# Patient Record
Sex: FEMALE | Race: WHITE | NOT HISPANIC OR LATINO | URBAN - METROPOLITAN AREA
[De-identification: names, ages, dates, MRNs, and addresses within clinical notes are randomized per-mention and may not be internally consistent; named-entity substitution may affect disease eponyms.]

---

## 2017-10-17 ENCOUNTER — OFFICE VISIT (OUTPATIENT)
Dept: FAMILY MEDICINE | Facility: CLINIC | Age: 22
End: 2017-10-17
Payer: COMMERCIAL

## 2017-10-17 VITALS
SYSTOLIC BLOOD PRESSURE: 138 MMHG | HEIGHT: 64 IN | BODY MASS INDEX: 24.24 KG/M2 | WEIGHT: 142 LBS | DIASTOLIC BLOOD PRESSURE: 90 MMHG | TEMPERATURE: 98.3 F | HEART RATE: 109 BPM

## 2017-10-17 DIAGNOSIS — Z00.01 ENCOUNTER FOR ROUTINE ADULT HEALTH EXAMINATION WITH ABNORMAL FINDINGS: Primary | ICD-10-CM

## 2017-10-17 DIAGNOSIS — Z23 NEED FOR PROPHYLACTIC VACCINATION WITH TETANUS-DIPHTHERIA (TD): ICD-10-CM

## 2017-10-17 DIAGNOSIS — Z30.015 ENCOUNTER FOR INITIAL PRESCRIPTION OF VAGINAL RING HORMONAL CONTRACEPTIVE: ICD-10-CM

## 2017-10-17 DIAGNOSIS — B07.9 VIRAL WARTS, UNSPECIFIED TYPE: ICD-10-CM

## 2017-10-17 DIAGNOSIS — Z11.3 SCREENING EXAMINATION FOR VENEREAL DISEASE: ICD-10-CM

## 2017-10-17 DIAGNOSIS — Z23 NEED FOR PROPHYLACTIC VACCINATION AND INOCULATION AGAINST INFLUENZA: ICD-10-CM

## 2017-10-17 DIAGNOSIS — Z12.4 SCREENING FOR MALIGNANT NEOPLASM OF CERVIX: ICD-10-CM

## 2017-10-17 DIAGNOSIS — R03.0 ELEVATED BLOOD PRESSURE READING WITHOUT DIAGNOSIS OF HYPERTENSION: ICD-10-CM

## 2017-10-17 PROCEDURE — 90686 IIV4 VACC NO PRSV 0.5 ML IM: CPT | Performed by: FAMILY MEDICINE

## 2017-10-17 PROCEDURE — 90472 IMMUNIZATION ADMIN EACH ADD: CPT | Performed by: FAMILY MEDICINE

## 2017-10-17 PROCEDURE — 90715 TDAP VACCINE 7 YRS/> IM: CPT | Performed by: FAMILY MEDICINE

## 2017-10-17 PROCEDURE — 90471 IMMUNIZATION ADMIN: CPT | Performed by: FAMILY MEDICINE

## 2017-10-17 PROCEDURE — 99213 OFFICE O/P EST LOW 20 MIN: CPT | Mod: 25 | Performed by: FAMILY MEDICINE

## 2017-10-17 PROCEDURE — 99385 PREV VISIT NEW AGE 18-39: CPT | Mod: 25 | Performed by: FAMILY MEDICINE

## 2017-10-17 RX ORDER — ETONOGESTREL AND ETHINYL ESTRADIOL VAGINAL RING .015; .12 MG/D; MG/D
1 RING VAGINAL
Qty: 1 EACH | Refills: 1 | Status: SHIPPED | OUTPATIENT
Start: 2017-10-17 | End: 2018-11-26

## 2017-10-17 RX ORDER — ETONOGESTREL AND ETHINYL ESTRADIOL VAGINAL RING .015; .12 MG/D; MG/D
1 RING VAGINAL
COMMUNITY
End: 2017-10-17

## 2017-10-17 NOTE — NURSING NOTE
Screening Questionnaire for Adult Immunization    Are you sick today?   No   Do you have allergies to medications, food, a vaccine component or latex?   Yes   Have you ever had a serious reaction after receiving a vaccination?   No   Do you have a long-term health problem with heart disease, lung disease, asthma, kidney disease, metabolic disease (e.g. diabetes), anemia, or other blood disorder?   No   Do you have cancer, leukemia, HIV/AIDS, or any other immune system problem?   No   In the past 3 months, have you taken medications that affect  your immune system, such as prednisone, other steroids, or anticancer drugs; drugs for the treatment of rheumatoid arthritis, Crohn s disease, or psoriasis; or have you had radiation treatments?   No   Have you had a seizure, or a brain or other nervous system problem?   No   During the past year, have you received a transfusion of blood or blood     products, or been given immune (gamma) globulin or antiviral drug?   No   For women: Are you pregnant or is there a chance you could become        pregnant during the next month?   No   Have you received any vaccinations in the past 4 weeks?   No     Immunization questionnaire was positive for at least one answer.  Notified Dr. Burrell.        Per orders of Dr. Burrell, injection of TDaP given by Brittney Blankenship. Patient instructed to remain in clinic for 15 minutes afterwards, and to report any adverse reaction to me immediately.       Screening performed by Brittney Blankenship on 10/17/2017 at 1:50 PM.

## 2017-10-17 NOTE — PATIENT INSTRUCTIONS
Follow up with dermatoligy as planned  Get IUd handout  If doing iud placement with me then I would advised cytote    Preventive Health Recommendations  Female Ages 18 to 25     Yearly exam:     See your health care provider every year in order to  o Review health changes.   o Discuss preventive care.    o Review your medicines if your doctor has prescribed any.      You should be tested each year for STDs (sexually transmitted diseases).       After age 20, talk to your provider about how often you should have cholesterol testing.      Starting at age 21, get a Pap test every three years. If you have an abnormal result, your doctor may have you test more often.      If you are at risk for diabetes, you should have a diabetes test (fasting glucose).     Shots:     Get a flu shot each year.     Get a tetanus shot every 10 years.     Consider getting the shot (vaccine) that prevents cervical cancer (Gardasil).    Nutrition:     Eat at least 5 servings of fruits and vegetables each day.    Eat whole-grain bread, whole-wheat pasta and brown rice instead of white grains and rice.    Talk to your provider about Calcium and Vitamin D.     Lifestyle    Exercise at least 150 minutes a week each week (30 minutes a day, 5 days a week). This will help you control your weight and prevent disease.    Limit alcohol to one drink per day.    No smoking.     Wear sunscreen to prevent skin cancer.    See your dentist every six months for an exam and cleaning.    [facility]   Discharged by : Brittney VERONICA CMA ([facility])    Paper scripts provided to patient : none      If you have any questions regarding your visit please contact your care team:     [facility] Clinic Hours Telephone Number   Dr. Winifred Nuñez   7am-7pm Monday - Thursday   7am-5pm Fridays  [phone]   (Appointment scheduling available 24/7)   RN Line   [phone] option 2       For a  Price Quote for your services, please call our Consumer Price Line at [phone].     What options do I have for visits at the clinic other than the traditional office visit?     To expand how we care for you, many of our providers are utilizing electronic visits (e-visits) and telephone visits, when medically appropriate, for interactions with their patients rather than a visit in the clinic. We also offer nurse visits for many medical concerns. Just like any other service, we will bill your insurance company for this type of visit based on time spent on the phone with your provider. Not all insurance companies cover these visits. Please check with your medical insurance if this type of visit is covered. You will be responsible for any charges that are not paid by your insurance.   E-visits via [employer]: generally incur a $35.00 fee.     Telephone visits:   Time spent on the phone: *charged based on time that is spent on the phone in increments of 10 minutes. Estimated cost:   5-10 mins $30.00   11-20 mins. $59.00   21-30 mins. $85.00     Use [employer] (secure email communication and access to your chart) to send your primary care provider a message or make an appointment. Ask someone on your Team how to sign up for [employer].     As always, Thank you for trusting us with your health care needs!      [facility]:    Scheduling Appointments  [place] [place]  Call: [phone]    [facility]  Call: [phone]    [facility]  Call: [phone]    For Gastroenterology referrals   [facility] Gastroenterology   Clinics and Surgery Center, 4th Floor   [address] [place] [address]   Appointments: [phone]    WHERE TO GO FOR CARE?  Clinic    Make an appointment if you:       Are sick (cold, cough, flu, sore throat, earache or in pain).       Have a small injury (sprain, small cut, burn or broken bone).       Need a  physical exam, Pap smear, vaccine or prescription refill.       Have questions about your health or medicines.    To reach us:      Call [phone] ([phone]). Open 24 hours every day. (For counseling services, call [phone].)    Log into [employer] at [email or web address]. (Visit [email or web address] to create an account.) Hospital emergency room    An emergency is a serious or life- threatening problem that must be treated right away.    Call 911 or get to the hospital if you have:      Very bad or sudden:            - Chest pain or pressure         - Bleeding         - Head or belly pain         - Dizziness or trouble seeing, walking or                          Speaking      Problems breathing      Blood in your vomit or you are coughing up blood      A major injury (knocked out, loss of a finger or limb, rape, broken bone protruding from skin)    A mental health crisis. (Or call the Mental Health Crisis line at 1-802.743.4162 or Suicide Prevention Hotline at 1-384.201.4336.)    Open 24 hours every day. You don't need an appointment.     Urgent care    Visit urgent care for sickness or small injuries when the clinic is closed. You don't need an appointment. To check hours or find an urgent care near you, visit [email or web address]. Online care    Get [facility] from [facility] for more than 70 common problems, like colds, allergies and infections. Open 24 hours every day at:   [email or web address]   Need help deciding?    For advice about where to be seen, you may call your clinic and ask to speak with a nurse. We're here for you 24 hours every day.         If you are deaf or hard of hearing, please let us know. We provide many free services including sign language interpreters, oral interpreters, TTYs, telephone amplifiers, note takers and written materials.

## 2017-10-17 NOTE — MR AVS SNAPSHOT
After Visit Summary   10/17/2017    Yady Rodriguez    MRN: [id number]           Patient Information     Date Of Birth          1995        Visit Information        Provider Department      10/17/2017 1:00 PM Shiva Burrell DO [facility]        Today's Diagnoses     Need for prophylactic vaccination and inoculation against influenza    -  1    Screening examination for venereal disease        Screening for malignant neoplasm of cervix        Need for prophylactic vaccination with tetanus-diphtheria (TD)        Viral warts, unspecified type          Care Instructions    Follow up with dermatoligy as planned  Get IUd handout  If doing iud placement with me then I would advised cytotec    Preventive Health Recommendations  Female Ages 18 to 25     Yearly exam:     See your health care provider every year in order to  o Review health changes.   o Discuss preventive care.    o Review your medicines if your doctor has prescribed any.      You should be tested each year for STDs (sexually transmitted diseases).       After age 20, talk to your provider about how often you should have cholesterol testing.      Starting at age 21, get a Pap test every three years. If you have an abnormal result, your doctor may have you test more often.      If you are at risk for diabetes, you should have a diabetes test (fasting glucose).     Shots:     Get a flu shot each year.     Get a tetanus shot every 10 years.     Consider getting the shot (vaccine) that prevents cervical cancer (Gardasil).    Nutrition:     Eat at least 5 servings of fruits and vegetables each day.    Eat whole-grain bread, whole-wheat pasta and brown rice instead of white grains and rice.    Talk to your provider about Calcium and Vitamin D.     Lifestyle    Exercise at least 150 minutes a week each week (30 minutes a day, 5 days a week). This will help you control your weight and prevent disease.    Limit alcohol to  one drink per day.    No smoking.     Wear sunscreen to prevent skin cancer.    See your dentist every six months for an exam and cleaning.    [facility]   Discharged by : Brittney VERONICA CMA ([facility])    Paper scripts provided to patient : none      If you have any questions regarding your visit please contact your care team:     Team [facility] Clinic Hours Telephone Number   Dr. Winifred Nuñez   7am-7pm Monday - Thursday   7am-5pm Fridays  [phone]   (Appointment scheduling available 24/7)   RN Line   [phone] option 2       For a Price Quote for your services, please call our Consumer Price Line at [phone].     What options do I have for visits at the clinic other than the traditional office visit?     To expand how we care for you, many of our providers are utilizing electronic visits (e-visits) and telephone visits, when medically appropriate, for interactions with their patients rather than a visit in the clinic. We also offer nurse visits for many medical concerns. Just like any other service, we will bill your insurance company for this type of visit based on time spent on the phone with your provider. Not all insurance companies cover these visits. Please check with your medical insurance if this type of visit is covered. You will be responsible for any charges that are not paid by your insurance.   E-visits via GeeYuut: generally incur a $35.00 fee.     Telephone visits:   Time spent on the phone: *charged based on time that is spent on the phone in increments of 10 minutes. Estimated cost:   5-10 mins $30.00   11-20 mins. $59.00   21-30 mins. $85.00     Use GeeYuut (secure email communication and access to your chart) to send your primary care provider a message or make an appointment. Ask someone on your Team how to sign up for [employer].     As always, Thank you for trusting us with your health care needs!      [place]  [facility]:    Scheduling Appointments  [facility]  Call: [phone]    [facility]  Call: [phone]    [facility]  Call: [phone]    For Gastroenterology referrals   [facility]   Clinics and Surgery Center, 4th Floor   [address] [place] [address]   Appointments: [phone]    WHERE TO GO FOR CARE?  Clinic    Make an appointment if you:       Are sick (cold, cough, flu, sore throat, earache or in pain).       Have a small injury (sprain, small cut, burn or broken bone).       Need a physical exam, Pap smear, vaccine or prescription refill.       Have questions about your health or medicines.    To reach us:      Call [phone] ([phone]). Open 24 hours every day. (For counseling services, call [phone].)    Log into [employer] at [employer]. (Visit CoreOS to create an account.) Hospital emergency room    An emergency is a serious or life- threatening problem that must be treated right away.    Call 219 or get to the hospital if you have:      Very bad or sudden:            - Chest pain or pressure         - Bleeding         - Head or belly pain         - Dizziness or trouble seeing, walking or                          Speaking      Problems breathing      Blood in your vomit or you are coughing up blood      A major injury (knocked out, loss of a finger or limb, rape, broken bone protruding from skin)    A mental health crisis. (Or call the Mental Health Crisis line at 1-561.855.6112 or Suicide Prevention Hotline at 1-552.503.1488.)    Open 24 hours every day. You don't need an appointment.     Urgent care    Visit urgent care for sickness or small injuries when the clinic is closed. You don't need an appointment. To check hours or find an urgent care near you, visit [email or web address]. Online care    Get online care from [facility] for more than 70 common  problems, like colds, allergies and infections. Open 24 hours every day at:   [email or web address]   Need help deciding?    For advice about where to be seen, you may call your clinic and ask to speak with a nurse. We're here for you 24 hours every day.         If you are deaf or hard of hearing, please let us know. We provide many free services including sign language interpreters, oral interpreters, TTYs, telephone amplifiers, note takers and written materials.                   Follow-ups after your visit        Additional Services     DERMATOLOGY REFERRAL       Your provider has referred you to: KELLY: [facility]  [place] [phone]   [email or web address]    Please be aware that coverage of these services is subject to the terms and limitations of your health insurance plan.  Call member services at your health plan with any benefit or coverage questions.      Please bring the following with you to your appointment:    (1) Any X-Rays, CTs or MRIs which have been performed.  Contact the facility where they were done to arrange for  prior to your scheduled appointment.    (2) List of current medications  (3) This referral request   (4) Any documents/labs given to you for this referral                  Who to contact     If you have questions or need follow up information about today's clinic visit or your schedule please contact [facility] directly at [phone].  Normal or non-critical lab and imaging results will be communicated to you by MyChart, letter or phone within 4 business days after the clinic has received the results. If you do not hear from us within 7 days, please contact the clinic through MyChart or phone. If you have a critical or abnormal lab result, we will notify you by phone as soon as possible.  Submit refill requests through RadPad or call your pharmacy and they will forward the refill request to us. Please allow 3 business days for your  "refill to be completed.          Additional Information About Your Visit        [employer]1har[employer] Information     Answer.To lets you send messages to your doctor, view your test results, renew your prescriptions, schedule appointments and more. To sign up, go to [email or web address] include 234 beds at the [email or web address] . Click on \"Log in\" on the left side of the screen, which will take you to the Welcome page. Then click on \"Sign up Now\" on the right side of the page.     You will be asked to enter the access code listed below, as well as some personal information. Please follow the directions to create your username and password.     Your access code is: [email or web address]  Expires: 1/15/2018  1:41 PM     Your access code will  in 90 days. If you need help or a new code, please call your [facility] clinic or [phone].        Care EveryWhere ID     This is your Care EveryWhere ID. This could be used by other organizations to access your [facility] medical records  [id number]        Your Vitals Were     Pulse Temperature Height Breastfeeding? BMI (Body Mass Index)       109 98.3  F (36.8  C) (Oral) 5' 4\" (1.626 m) No 24.37 kg/m2        Blood Pressure from Last 3 Encounters:   10/17/17 (!) 142/92    Weight from Last 3 Encounters:   10/17/17 142 lb (64.4 kg)              We Performed the Following     DERMATOLOGY REFERRAL     FLU VAC, SPLIT VIRUS IM > 3 YO (QUADRIVALENT) [67718]     TDAP VACCINE (ADACEL)     Vaccine Administration, Initial [21350]        Primary Care Provider    None Specified       No primary provider on file.        Equal Access to Services     DEBAR SOLORIO : Hadii sissy farrell Soeben, waaxda luqadaha, qaybta kaalmasarah berrios . So [facility] [phone].    ATENCIÓN: Si habla español, tiene a torres disposición servicios gratuitos de asistencia lingüística. Llame al [phone].    We comply with applicable federal civil rights laws and [place] laws. We do not discriminate on the basis of race, color, " national origin, age, disability, sex, sexual orientation, or gender identity.            Thank you!     Thank you for choosing [facility]  for your care. Our goal is always to provide you with excellent care. Hearing back from our patients is one way we can continue to improve our services. Please take a few minutes to complete the written survey that you may receive in the mail after your visit with us. Thank you!             Your Updated Medication List - Protect others around you: Learn how to safely use, store and throw away your medicines at www.disposemymeds.org.          This list is accurate as of: 10/17/17  1:41 PM.  Always use your most recent med list.                   Brand Name Dispense Instructions for use Diagnosis    etonogestrel-ethinyl estradiol 0.12-0.015 MG/24HR vaginal ring    NUVARING     Place 1 each vaginally every 28 days

## 2017-10-17 NOTE — NURSING NOTE
"Chief Complaint   Patient presents with     Physical       Initial BP (!) 142/92  Pulse 109  Temp 98.3  F (36.8  C) (Oral)  Ht 5' 4\" (1.626 m)  Wt 142 lb (64.4 kg)  Breastfeeding? No  BMI 24.37 kg/m2 Estimated body mass index is 24.37 kg/(m^2) as calculated from the following:    Height as of this encounter: 5' 4\" (1.626 m).    Weight as of this encounter: 142 lb (64.4 kg).  Medication Reconciliation: complete    "

## 2017-10-17 NOTE — PROGRESS NOTES
SUBJECTIVE:   CC: Yady Rodriguez is an 21 year old woman who presents for preventive health visit.     Physical   Annual:     Getting at least 3 servings of Calcium per day::  Yes    Bi-annual eye exam::  NO    Dental care twice a year::  Yes    Sleep apnea or symptoms of sleep apnea::  None    Diet::  Regular (no restrictions)    Frequency of exercise::  1 day/week    Duration of exercise::  45-60 minutes    Taking medications regularly::  Yes    Medication side effects::  Not applicable    Additional concerns today::  No      * discuss birth control options  * ongoing warts on legs, OTC meds are not helping   She has been to see pcp for burning and freezing off warts and did not help, she would be ok with getting a referral to dermatology    She is interested in discussing options of other contraception    The patient is interested in mirena IUD.  The patient meets and is agreeable to the following conditions:  She is parous.  She is not interested in conception in the near future.no for a while  She currently is in a stable, monogamous relationship.Yes  There is no previous history of pelvic inflammatory disease.Yes  There is no previous history of ectopic pregnancy.Yes  She is willing to check monthly for the IUD string.Yes  She is at least 6 weeks post-partum.Yes  There is no history of unresolved abnormal uterine bleeding.Yes  There is no history of an unresolved abnormal PAP smear.Yes  She has no history of Jefferson's disease or an allergy to copper (for ParaGard).Yes  She has no history of diabetes, AIDS, leukemia, IV drug use or chronic steroid use.Yes  She is willing to return annually for PAP smears.Yes  She has had a PAP smear within the past 6 months.Yes  She denies the possibility of pregnancy.Yes  Pregnancy test today is negative.Yes    The following risks were discussed with the patient:  Possibility of pregnancy and ectopic pregnancy.Yes  Possibility of pelvic inflammatory disease, particularly  with new partners.Yes  Risk of uterine perforation or IUD expulsion.Yes  Possibility of difficult removal.Yes  Spotting or heavy bleeding.    Cramping, pain or infection during or after insertion.    The patient was given patient information on the IUD and the patient education brochure from the .  This patient has completed her IUD consult and can be scheduled for the placement procedure.      Today's PHQ-2 Score:   PHQ-2 ( 1999 Pfizer) 10/17/2017   Q1: Little interest or pleasure in doing things 0   Q2: Feeling down, depressed or hopeless 0   PHQ-2 Score 0   Q1: Little interest or pleasure in doing things Not at all   Q2: Feeling down, depressed or hopeless Not at all   PHQ-2 Score 0       Abuse: Current or Past(Physical, Sexual or Emotional)- No  Do you feel safe in your environment - Yes    Social History   Substance Use Topics     Smoking status: Former Smoker     Smokeless tobacco: Never Used     Alcohol use Yes      Comment: 2-3 drinks a week      The patient does not drink >3 drinks per day nor >7 drinks per week.    Reviewed orders with patient.  Reviewed health maintenance and updated orders accordingly - Yes  Labs reviewed in EPIC    Mammogram not appropriate for this patient based on age.    Pertinent mammograms are reviewed under the imaging tab.  History of abnormal Pap smear: NO - age 21-29 PAP every 3 years recommended    Reviewed and updated as needed this visit by clinical staff  Tobacco  Allergies  Med Hx  Surg Hx  Fam Hx  Soc Hx        Reviewed and updated as needed this visit by Provider        History reviewed. No pertinent past medical history.   History reviewed. No pertinent surgical history.  Obstetric History     No data available          ROS:  C: NEGATIVE for fever, chills, change in weight  I: NEGATIVE for worrisome rashes, moles or lesions  E: NEGATIVE for vision changes or irritation  ENT: NEGATIVE for ear, mouth and throat problems  R: NEGATIVE for significant cough  "or SOB  B: NEGATIVE for masses, tenderness or discharge  CV: NEGATIVE for chest pain, palpitations or peripheral edema  GI: NEGATIVE for nausea, abdominal pain, heartburn, or change in bowel habits  : NEGATIVE for unusual urinary or vaginal symptoms. Periods are regular.  M: NEGATIVE for significant arthralgias or myalgia  N: NEGATIVE for weakness, dizziness or paresthesias  P: NEGATIVE for changes in mood or affect     OBJECTIVE:   /90  Pulse 109  Temp 98.3  F (36.8  C) (Oral)  Ht 5' 4\" (1.626 m)  Wt 142 lb (64.4 kg)  Breastfeeding? No  BMI 24.37 kg/m2  EXAM:  GENERAL: healthy, alert and no distress  EYES: Eyes grossly normal to inspection, PERRL and conjunctivae and sclerae normal  HENT: ear canals and TM's normal, nose and mouth without ulcers or lesions  NECK: no adenopathy, no asymmetry, masses, or scars and thyroid normal to palpation  RESP: lungs clear to auscultation - no rales, rhonchi or wheezes  BREAST: normal without masses, tenderness or nipple discharge and no palpable axillary masses or adenopathy  CV: regular rate and rhythm, normal S1 S2, no S3 or S4, no murmur, click or rub, no peripheral edema and peripheral pulses strong  ABDOMEN: soft, nontender, no hepatosplenomegaly, no masses and bowel sounds normal  MS: no gross musculoskeletal defects noted, no edema  SKIN: several warts on legs   NEURO: Normal strength and tone, mentation intact and speech normal  PSYCH: mentation appears normal, affect normal/bright    ASSESSMENT/PLAN:       ICD-10-CM    1. Encounter for routine adult health examination with abnormal findings Z00.01    2. Viral warts, unspecified type B07.9 DERMATOLOGY REFERRAL   3. Encounter for initial prescription of vaginal ring hormonal contraceptive Z30.015 etonogestrel-ethinyl estradiol (NUVARING) 0.12-0.015 MG/24HR vaginal ring   4. Elevated blood pressure reading without diagnosis of hypertension R03.0    5. Screening examination for venereal disease Z11.3    6. " "Screening for malignant neoplasm of cervix Z12.4    7. Need for prophylactic vaccination with tetanus-diphtheria (TD) Z23    8. Need for prophylactic vaccination and inoculation against influenza Z23 TDAP VACCINE (ADACEL)     FLU VAC, SPLIT VIRUS IM > 3 YO (QUADRIVALENT) [64289]     Vaccine Administration, Initial [12951]     New patient to this clinic  Warts-in the past treated in her pcp and now wants referral, referral placed  Elevated bp-per patient it is usually high when she is nervious and come down, advised recheck with RN or pharmacy  Contraception-reviewed all options , including iud, she would like to proceed with iud placement, full consult done. Follow up as planned      COUNSELING:  Reviewed preventive health counseling, as reflected in patient instructions         reports that she has quit smoking. She has never used smokeless tobacco.    Estimated body mass index is 24.37 kg/(m^2) as calculated from the following:    Height as of this encounter: 5' 4\" (1.626 m).    Weight as of this encounter: 142 lb (64.4 kg).         Counseling Resources:  ATP IV Guidelines  Pooled Cohorts Equation Calculator  Breast Cancer Risk Calculator  FRAX Risk Assessment  ICSI Preventive Guidelines  Dietary Guidelines for Americans, 2010  USDA's MyPlate  ASA Prophylaxis  Lung CA Screening    Shiva Burrell DO  [facility] for HPI/ROS submitted by the patient on 10/17/2017   PHQ-2 Score: 0    Injectable Influenza Immunization Documentation    1.  Is the person to be vaccinated sick today?   No    2. Does the person to be vaccinated have an allergy to a component   of the vaccine?   No    3. Has the person to be vaccinated ever had a serious reaction   to influenza vaccine in the past?   No    4. Has the person to be vaccinated ever had Guillain-Barré syndrome?   No    Form completed by patient         "

## 2017-10-20 ENCOUNTER — ALLIED HEALTH/NURSE VISIT (OUTPATIENT)
Dept: FAMILY MEDICINE | Facility: CLINIC | Age: 22
End: 2017-10-20
Payer: COMMERCIAL

## 2017-10-20 VITALS — SYSTOLIC BLOOD PRESSURE: 135 MMHG | HEART RATE: 82 BPM | DIASTOLIC BLOOD PRESSURE: 88 MMHG

## 2017-10-20 DIAGNOSIS — R03.0 ELEVATED BLOOD PRESSURE READING WITHOUT DIAGNOSIS OF HYPERTENSION: Primary | ICD-10-CM

## 2017-10-20 PROCEDURE — 99207 ZZC NO CHARGE NURSE ONLY: CPT | Performed by: FAMILY MEDICINE

## 2017-10-20 NOTE — NURSING NOTE
Yady Rodriguez is enrolled/participating in the retail pharmacy Blood Pressure Goals Achievement Program (BPGAP).  Yady Rodriguez was evaluated at [facility] on October 20, 2017 at which time her blood pressure was:    BP Readings from Last 3 Encounters:   10/20/17 135/88   10/17/17 138/90     Reviewed lifestyle modifications for blood pressure control and reduction: including making healthy food choices, managing weight, getting regular exercise, smoking cessation, reducing alcohol consumption, monitoring blood pressure regularly.     Yady Rodriguez is not experiencing symptoms.    Follow-Up: BP is not at goal of < 140/90mmHg (patient 18+ years of age with or without diabetes), Recommended follow-up with PCP.  Routing to PCP for further review.    Recommendation to Provider: Please review.  I encouraged Yady to seek medical care/BP check with headaches, blurred vision.  I also suggested F/U for BP check in about 2 weeks.    Yady Rodriguez was evaluated for enrollment into the PGEN study today.    Patient eligible for enrollment:  No  Patient interested in enrollment:  No    Completed by:   Rachel Hampton, PharmD [facility]  Phone [phone]  Fax [phone]

## 2017-10-20 NOTE — MR AVS SNAPSHOT
"              After Visit Summary   10/20/2017    Yady Rodriguez    MRN: [id number]           Patient Information     Date Of Birth          1995        Visit Information        Provider Department      10/20/2017 2:00 PM Shiva Burrell DO [facility]        Today's Diagnoses     Elevated blood pressure reading without diagnosis of hypertension    -  1       Follow-ups after your visit        Who to contact     If you have questions or need follow up information about today's clinic visit or your schedule please contact [facility] directly at [phone].  Normal or non-critical lab and imaging results will be communicated to you by Nu-Pulsehart, letter or phone within 4 business days after the clinic has received the results. If you do not hear from us within 7 days, please contact the clinic through Nu-Pulsehart or phone. If you have a critical or abnormal lab result, we will notify you by phone as soon as possible.  Submit refill requests through T[employer] or call your pharmacy and they will forward the refill request to us. Please allow 3 business days for your refill to be completed.          Additional Information About Your Visit        MyChart Information     [employer] lets you send messages to your doctor, view your test results, renew your prescriptions, schedule appointments and more. To sign up, go to [email or web address] . Click on \"Log in\" on the left side of the screen, which will take you to the Welcome page. Then click on \"Sign up Now\" on the right side of the page.     You will be asked to enter the access code listed below, as well as some personal information. Please follow the directions to create your username and password.     Your access code is: [email or web address]  Expires: 1/15/2018  1:41 PM     Your access code will  in 90 days. If you need help or a new code, please call your [facility] or [phone].        Care EveryWhere ID     This is " your Care EveryWhere ID. This could be used by other organizations to access your [facility] medical records  [id number]        Your Vitals Were     Pulse                   82            Blood Pressure from Last 3 Encounters:   10/20/17 135/88   10/17/17 138/90    Weight from Last 3 Encounters:   10/17/17 142 lb (64.4 kg)              Today, you had the following     No orders found for display       Primary Care Provider    None Specified       No primary provider on file.        Equal Access to Services     [facility] : Hadii aad ku hadvalentino Sopriscilaali, waaxda luqadaha, qaybta kaalmada adeegyada, waxay idiin hayjanellegil snydercliffmarianela rosales . So [facility] [phone].    ATENCIÓN: Si habla español, tiene a torres disposición servicios gratuitos de asistencia lingüística. Llame al [phone].    We comply with applicable federal civil rights laws and [place] laws. We do not discriminate on the basis of race, color, national origin, age, disability, sex, sexual orientation, or gender identity.            Thank you!     Thank you for choosing [facility]  for your care. Our goal is always to provide you with excellent care. Hearing back from our patients is one way we can continue to improve our services. Please take a few minutes to complete the written survey that you may receive in the mail after your visit with us. Thank you!             Your Updated Medication List - Protect others around you: Learn how to safely use, store and throw away your medicines at www.disposemymeds.org.          This list is accurate as of: 10/20/17  2:06 PM.  Always use your most recent med list.                   Brand Name Dispense Instructions for use Diagnosis    etonogestrel-ethinyl estradiol 0.12-0.015 MG/24HR vaginal ring    NUVARING    1 each    Place 1 each vaginally every 28 days    Encounter for initial prescription of vaginal ring hormonal contraceptive

## 2017-11-08 ENCOUNTER — OFFICE VISIT (OUTPATIENT)
Dept: FAMILY MEDICINE | Facility: CLINIC | Age: 22
End: 2017-11-08
Payer: COMMERCIAL

## 2017-11-08 VITALS
WEIGHT: 141 LBS | BODY MASS INDEX: 24.07 KG/M2 | HEIGHT: 64 IN | TEMPERATURE: 98.8 F | SYSTOLIC BLOOD PRESSURE: 144 MMHG | DIASTOLIC BLOOD PRESSURE: 94 MMHG | HEART RATE: 80 BPM

## 2017-11-08 DIAGNOSIS — R03.0 ELEVATED BLOOD PRESSURE READING WITHOUT DIAGNOSIS OF HYPERTENSION: ICD-10-CM

## 2017-11-08 DIAGNOSIS — R21 RASH AND NONSPECIFIC SKIN ERUPTION: Primary | ICD-10-CM

## 2017-11-08 LAB
KOH PREP SPEC: NORMAL
SPECIMEN SOURCE: NORMAL

## 2017-11-08 PROCEDURE — 99214 OFFICE O/P EST MOD 30 MIN: CPT | Performed by: PHYSICIAN ASSISTANT

## 2017-11-08 PROCEDURE — 36415 COLL VENOUS BLD VENIPUNCTURE: CPT | Performed by: PHYSICIAN ASSISTANT

## 2017-11-08 PROCEDURE — 87220 TISSUE EXAM FOR FUNGI: CPT | Performed by: PHYSICIAN ASSISTANT

## 2017-11-08 PROCEDURE — 86618 LYME DISEASE ANTIBODY: CPT | Performed by: PHYSICIAN ASSISTANT

## 2017-11-08 RX ORDER — KETOCONAZOLE 20 MG/G
CREAM TOPICAL 2 TIMES DAILY
Qty: 30 G | Refills: 1 | Status: SHIPPED | OUTPATIENT
Start: 2017-11-08 | End: 2017-12-22

## 2017-11-08 ASSESSMENT — ENCOUNTER SYMPTOMS
GASTROINTESTINAL NEGATIVE: 1
RESPIRATORY NEGATIVE: 1
WEIGHT LOSS: 0
EYE PAIN: 0
HEMOPTYSIS: 0
CONSTITUTIONAL NEGATIVE: 1
FEVER: 0
CARDIOVASCULAR NEGATIVE: 1
PALPITATIONS: 0
DIAPHORESIS: 0
COUGH: 0

## 2017-11-08 NOTE — LETTER
[facility]  [address] [place] [id number] [phone]                                                                                                November 10, 2017    Yady Rodriguez  [address]  [place] [address]        Dear Ms. Bindushannan,    The results of your recent lab tests were within normal limits. Enclosed is a copy of these results.  If you have any further questions or problems, please contact our office.    Results for orders placed or performed in visit on 11/08/17   Lyme Disease Marisel with reflex to WB Serum   Result Value Ref Range    Lyme Disease Antibodies Serum <0.01 0.00 - 0.89   KOH prep (skin, hair or nails only)   Result Value Ref Range    Specimen Description Skin     KOH Skin Hair Nails Test No fungal elements seen        Sincerely,      Claritza See Concepcion BECKFORD/cody

## 2017-11-08 NOTE — PROGRESS NOTES
SUBJECTIVE:      HPI  Yady Rodriguez is a 21 year old female who presents to clinic today for the following health issues:  Rash  Onset: 3 weeks     Description:   Location: Chest, shoulders, neck and possibly on her scalp   Character: round, raised, red  Itching (Pruritis): mild     Progression of Symptoms:  Worsening and spreading.  No changes in size, shape or color.       Accompanying Signs & Symptoms:  Fever: no.  Reports fatigue and stomach flu symptoms awhile back.  Did work in the woods awhile back as well.  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  No one else in the family with similar rashes.  No URI symptoms or fevers.    Recent travel: no     Alleviating factors:  None   Therapies Tried and outcome: Lotrimin has helped some.       Reviewed PMH.  Patient Active Problem List   Diagnosis     Elevated blood pressure reading without diagnosis of hypertension     Current Outpatient Prescriptions   Medication Sig Dispense Refill     etonogestrel-ethinyl estradiol (NUVARING) 0.12-0.015 MG/24HR vaginal ring Place 1 each vaginally every 28 days 1 each 1     Allergies   Allergen Reactions     Hydrocodone Itching       Review of Systems   Constitutional: Negative.  Negative for diaphoresis, fever and weight loss.   HENT: Negative.    Eyes: Negative for pain.   Respiratory: Negative.  Negative for cough and hemoptysis.    Cardiovascular: Negative.  Negative for chest pain and palpitations.   Gastrointestinal: Negative.    Skin: Positive for rash.   Endo/Heme/Allergies: Negative for environmental allergies.   All other systems reviewed and are negative.      BP (!) 144/94 (BP Location: Right arm, Cuff Size: Adult Regular)  Pulse 80   "Temp 98.8  F (37.1  C) (Oral)  Ht 5' 4\" (1.626 m)  Wt 141 lb (64 kg)  BMI 24.2 kg/m2  Physical Exam   Constitutional: She is oriented to person, place, and time and well-developed, well-nourished, and in no distress. No distress.   Neurological: She is alert and oriented to person, place, and time.   Skin: Skin is warm, dry and intact. Lesion and rash noted.   Annular lesions with raised, scaly borders and central clearing present over the L side of the chest and neck.  Largest measuring 6hsI1we over the L chest and smaller ones measuring 3wnB5yl present over her anterior neck.  No erythema, tenderness or discharge present.     Psychiatric: Mood and affect normal.   Nursing note and vitals reviewed.        Assessment/Plan:  Rash and nonspecific skin eruption:  KOH prep was negative for fungal elements.  She did have some improvement with OTC lotrimin.  ?tinea vs lymes vs grauloma annulare.  Will send for lymes test and give trial of ketoconazole cream X2weeks.  Avoid triggers and irritating agents.  Avoid scratching to present secondary infection.  Will send to DERM if no improvement.  RTC if worsening rash or if she develops redness, swelling, drainage or fevers.     -     KOH prep (skin, hair or nails only)  -     Lyme Disease Marisel with reflex to WB Serum  -     DERMATOLOGY REFERRAL  -     ketoconazole (NIZORAL) 2 % cream; Apply topically 2 times daily    Elevated blood pressure reading without diagnosis of hypertension:  Borderline elevated, goal <120/80.  Will continue to monitor at this time.  Encouraged low sodium diet with regular exercise and limited caffeine and stress reduction.  Recheck in 1-2weeks at the pharmacy.  F/u with PCP if still elevated.           Claritza See SHAKEEL Javier          "

## 2017-11-08 NOTE — MR AVS SNAPSHOT
After Visit Summary   11/8/2017    Yady Rodriguez    MRN: [id number]           Patient Information     Date Of Birth          1995        Visit Information        Provider Department      11/8/2017 12:40 PM Claritza Javier PA-C [facility]        Today's Diagnoses     Rash and nonspecific skin eruption    -  1       Follow-ups after your visit        Additional Services     DERMATOLOGY REFERRAL       Your provider has referred you to: [facility]: [facility] [phone]   [email or web address]  [facility]: [facility] [phone]   [email or web address]  [facility]: [facility] [place] [phone]   [email or web address]  [facility] [phone]   [email or web address]  [place] ([address] [place] [address]) [phone]   [email or web address]  [place] [phone]   [email or web address]    Please be aware that coverage of these services is subject to the terms and limitations of your health insurance plan.  Call member services at your health plan with any benefit or coverage questions.      Please bring the following with you to your appointment:    (1) Any X-Rays, CTs or MRIs which have been performed.  Contact the facility where they were done to arrange for  prior to your scheduled appointment.    (2) List of current medications  (3) This referral request   (4) Any documents/labs given to you for this referral                  Who to contact     If you have questions or need follow up information about today's clinic visit or your schedule please contact [facility] directly at [phone].  Normal or non-critical lab and imaging results will be communicated to you by MyChart, letter or phone within 4 business days after the clinic has  "received the results. If you do not hear from us within 7 days, please contact the clinic through Meshify or phone. If you have a critical or abnormal lab result, we will notify you by phone as soon as possible.  Submit refill requests through Meshify or call your pharmacy and they will forward the refill request to us. Please allow 3 business days for your refill to be completed.          Additional Information About Your Visit        Meshify Information     [employer] lets you send messages to your doctor, view your test results, renew your prescriptions, schedule appointments and more. To sign up, go to [email or web address] . Click on \"Log in\" on the left side of the screen, which will take you to the Welcome page. Then click on \"Sign up Now\" on the right side of the page.     You will be asked to enter the access code listed below, as well as some personal information. Please follow the directions to create your username and password.     Your access code is: [email or web address]  Expires: 1/15/2018 12:41 PM     Your access code will  in 90 days. If you need help or a new code, please call your [place] clinic or [phone].        Care EveryWhere ID     This is your Care EveryWhere ID. This could be used by other organizations to access your [place] medical records  [id number]        Your Vitals Were     Pulse Temperature Height BMI (Body Mass Index)          80 98.8  F (37.1  C) (Oral) 5' 4\" (1.626 m) 24.2 kg/m2         Blood Pressure from Last 3 Encounters:   17 (!) 144/94   10/20/17 135/88   10/17/17 138/90    Weight from Last 3 Encounters:   17 141 lb (64 kg)   10/17/17 142 lb (64.4 kg)              We Performed the Following     DERMATOLOGY REFERRAL     SHAJI prep (skin, hair or nails only)     Lyme Disease Marisel with reflex to WB Serum          Today's Medication Changes          These changes are accurate as of: 17  1:36 PM.  If you have any questions, ask your nurse or doctor.          "      Start taking these medicines.        Dose/Directions    ketoconazole 2 % cream   Commonly known as:  NIZORAL   Used for:  Rash and nonspecific skin eruption   Started by:  Claritza Javier PA-C        Apply topically 2 times daily   Quantity:  30 g   Refills:  1            Where to get your medicines      These medications were sent to [facility] - [address] [address]     Phone:  [phone]     ketoconazole 2 % cream                Primary Care Provider    Physician No Ref-Primary       NO REF-PRIMARY PHYSICIAN        Equal Access to Services     [facility]: Hadii aad ku hadasho Soomaali, waaxda luqadaha, qaybta kaalmada adeegyada, sarah rosales . So [facility] [phone].    ATENCIÓN: Si habla español, tiene a torres disposición servicios gratuitos de asistencia lingüística. [facility] [phone].    We comply with applicable federal civil rights laws and [place] laws. We do not discriminate on the basis of race, color, national origin, age, disability, sex, sexual orientation, or gender identity.            Thank you!     Thank you for choosing [facility]  for your care. Our goal is always to provide you with excellent care. Hearing back from our patients is one way we can continue to improve our services. Please take a few minutes to complete the written survey that you may receive in the mail after your visit with us. Thank you!             Your Updated Medication List - Protect others around you: Learn how to safely use, store and throw away your medicines at www.disposemymeds.org.          This list is accurate as of: 11/8/17  1:36 PM.  Always use your most recent med list.                   Brand Name Dispense Instructions for use Diagnosis    etonogestrel-ethinyl estradiol 0.12-0.015 MG/24HR vaginal ring    NUVARING    1 each    Place 1 each vaginally every 28 days    Encounter for initial prescription of  vaginal ring hormonal contraceptive       ketoconazole 2 % cream    NIZORAL    30 g    Apply topically 2 times daily    Rash and nonspecific skin eruption

## 2017-11-08 NOTE — NURSING NOTE
"Chief Complaint   Patient presents with     Derm Problem       Initial BP (!) 144/94 (BP Location: Right arm, Cuff Size: Adult Regular)  Pulse 80  Temp 98.8  F (37.1  C) (Oral)  Ht 5' 4\" (1.626 m)  Wt 141 lb (64 kg)  BMI 24.2 kg/m2 Estimated body mass index is 24.2 kg/(m^2) as calculated from the following:    Height as of this encounter: 5' 4\" (1.626 m).    Weight as of this encounter: 141 lb (64 kg).  Medication Reconciliation: complete     Stefanie Ellis CMA (AAMA)      "

## 2017-11-09 LAB — B BURGDOR IGG+IGM SER QL: <0.01 (ref 0–0.89)

## 2017-11-13 ENCOUNTER — DOCUMENTATION ONLY (OUTPATIENT)
Dept: FAMILY MEDICINE | Facility: CLINIC | Age: 22
End: 2017-11-13

## 2017-11-13 NOTE — PROGRESS NOTES
Records received from [facility].  [facility] to review and return to be scanned into chart.      .Betzy Franco  Patient Representative

## 2017-12-05 ENCOUNTER — TELEPHONE (OUTPATIENT)
Dept: FAMILY MEDICINE | Facility: CLINIC | Age: 22
End: 2017-12-05

## 2017-12-05 DIAGNOSIS — Z30.431 ENCOUNTER FOR MANAGEMENT OF INTRAUTERINE CONTRACEPTIVE DEVICE (IUD), UNSPECIFIED IUD MANAGEMENT TYPE: Primary | ICD-10-CM

## 2017-12-05 NOTE — TELEPHONE ENCOUNTER
Reason for Call:  Medication or medication refill:    Do you use a [facility]?  Name of the pharmacy and phone number for the current request:  [facility] pharmacy in [place]    Name of the medication requested: Medication discussed at physical to take the day before IUD placement    Other request:     Can we leave a detailed message on this number? YES    Phone number patient can be reached at: Home number on file [phone] (home)    Best Time: any    Call taken on 12/5/2017 at 9:13 AM by Magalys Hayes

## 2017-12-09 RX ORDER — MISOPROSTOL 100 UG/1
TABLET ORAL
Qty: 2 TABLET | Refills: 0 | Status: SHIPPED | OUTPATIENT
Start: 2017-12-09 | End: 2017-12-22

## 2017-12-22 ENCOUNTER — OFFICE VISIT (OUTPATIENT)
Dept: FAMILY MEDICINE | Facility: CLINIC | Age: 22
End: 2017-12-22
Payer: COMMERCIAL

## 2017-12-22 VITALS
HEIGHT: 64 IN | WEIGHT: 142 LBS | BODY MASS INDEX: 24.24 KG/M2 | DIASTOLIC BLOOD PRESSURE: 84 MMHG | HEART RATE: 80 BPM | TEMPERATURE: 99.2 F | SYSTOLIC BLOOD PRESSURE: 134 MMHG

## 2017-12-22 DIAGNOSIS — Z30.430 ENCOUNTER FOR IUD INSERTION: Primary | ICD-10-CM

## 2017-12-22 LAB — BETA HCG QUAL IFA URINE: NEGATIVE

## 2017-12-22 PROCEDURE — 84703 CHORIONIC GONADOTROPIN ASSAY: CPT | Performed by: FAMILY MEDICINE

## 2017-12-22 PROCEDURE — 58300 INSERT INTRAUTERINE DEVICE: CPT | Performed by: FAMILY MEDICINE

## 2017-12-22 NOTE — PROGRESS NOTES
"  SUBJECTIVE:   Yady Rodriguez is a 22 year old female who presents to clinic today for the following health issues:  {Provider please address medication reconciliation discrepancies--rooming staff please delete if no med/rec issues}    {Superlists:198699}    {additional problems for provider to add:528274}    Problem list and histories reviewed & adjusted, as indicated.  Additional history: {NONE - AS DOCUMENTED:757600::\"as documented\"}    {HIST REVIEW/ LINKS 2:513619}    Reviewed and updated as needed this visit by clinical staff  Tobacco  Allergies  Meds  Med Hx  Surg Hx  Fam Hx  Soc Hx      Reviewed and updated as needed this visit by Provider         {PROVIDER CHARTING PREFERENCE:103405}  "

## 2017-12-22 NOTE — PROGRESS NOTES
IUD  INSERTION PROCEDURE   Yady Rodriguez who presents for *** IUD insertion. Indication for IUD insertion is contraception. Patient's last menstrual period was No LMP recorded. Patient is not currently having periods (Reason: Birth Control).. . The patient is currently using OCPs for contraception. She is in a monogamous sexual relationship.   No results found for: PAP   pregnancy test :***  A complete discussion of the risks and benefits of IUD use and the details of the insertion procedure was held with the patient.   All questions were answered. A consent form was signed.   Prior to the beginning of the procedure the team paused to verify the patient's identity, as well as the procedure to be performed and the site. All equipment required was ready and available. The patient was positioned appropriately.   IUD Lot # ***  NDC # 56644-808-50 USE ONLY FOR MIRENA  NDC # 99151-916-34 USE ONLY FOR PARAGUARD  The patient was placed in low lithotomy. A bimanual exam was performed and the uterus noted to be ***. A speculum was placed and the cervix swabbed with Betadine. A tenaculum was placed on the anterior cervical lip. The uterus sounded to ** cm. The Mirena IUD was placed to the uterine fundus without difficulty. The strings were cut to 3 cms. The tenaculum was removed and hemostasis was ensured. The speculum was removed. The patient tolerated the procedure well.   PLAN:   The patient was asked to contact the clinic for any fever/chills/severe pelvic or abdominal pain or heavy bleeding. She was instructed in how to palpate her IUD strings.   FOLLOW-UP:   She was asked to follow up in one month for IUD check, and for her routine annual screening.     Monica Hernández MD

## 2017-12-22 NOTE — MR AVS SNAPSHOT
"              After Visit Summary   12/22/2017    Yady Rodriguez    MRN: [id number]           Patient Information     Date Of Birth          1995        Visit Information        Provider Department      12/22/2017 2:00 PM Shiva Burrell DO; [facility] [place]        Today's Diagnoses     Contraceptive management    -  1       Follow-ups after your visit        Who to contact     If you have questions or need follow up information about today's clinic visit or your schedule please contact [facility] directly at [phone].  Normal or non-critical lab and imaging results will be communicated to you by xoomparkhart, letter or phone within 4 business days after the clinic has received the results. If you do not hear from us within 7 days, please contact the clinic through xoomparkhart or phone. If you have a critical or abnormal lab result, we will notify you by phone as soon as possible.  Submit refill requests through "[employer] or call your pharmacy and they will forward the refill request to us. Please allow 3 business days for your refill to be completed.          Additional Information About Your Visit        MyChart Information     "[employer] gives you secure access to your electronic health record. If you see a primary care provider, you can also send messages to your care team and make appointments. If you have questions, please call your primary care clinic.  If you do not have a primary care provider, please call [phone] and they will assist you.        Care EveryWhere ID     This is your Care EveryWhere ID. This could be used by other organizations to access your [facility] medical records  [id number]        Your Vitals Were     Pulse Temperature Height Last Period BMI (Body Mass Index)       80 99.2  F (37.3  C) (Oral) 5' 4\" (1.626 m) 12/20/2017 (Approximate) 24.37 kg/m2        Blood Pressure from Last 3 Encounters:   12/22/17 134/84   11/08/17 (!) " 144/94   10/20/17 135/88    Weight from Last 3 Encounters:   12/22/17 142 lb (64.4 kg)   11/08/17 141 lb (64 kg)   10/17/17 142 lb (64.4 kg)              We Performed the Following     Beta HCG qual [facility] urine - [facility] and [place]        Primary Care Provider Office Phone # Fax #    Shiva Burrell [phone]       [address] [facility] [address]        Equal Access to Services     ROMULO SOLORIO : Hadii aad ku hadasho Soomaali, waaxda luqadaha, qaybta kaalmada adeegyada, waxay idiin hayaan adealan kheduar rosales . So [facility] [phone].    ATENCIÓN: Si habla español, tiene a torres disposición servicios gratuitos de asistencia lingüística. Llame al [phone].    We comply with applicable federal civil rights laws and [place] laws. We do not discriminate on the basis of race, color, national origin, age, disability, sex, sexual orientation, or gender identity.            Thank you!     Thank you for choosing [facility]  for your care. Our goal is always to provide you with excellent care. Hearing back from our patients is one way we can continue to improve our services. Please take a few minutes to complete the written survey that you may receive in the mail after your visit with us. Thank you!             Your Updated Medication List - Protect others around you: Learn how to safely use, store and throw away your medicines at www.disposemymeds.org.          This list is accurate as of: 12/22/17  2:34 PM.  Always use your most recent med list.                   Brand Name Dispense Instructions for use Diagnosis    etonogestrel-ethinyl estradiol 0.12-0.015 MG/24HR vaginal ring    NUVARING    1 each    Place 1 each vaginally every 28 days    Encounter for initial prescription of vaginal ring hormonal contraceptive

## 2017-12-22 NOTE — PROGRESS NOTES
IUD  INSERTION PROCEDURE   Yady Rodriguez who presents for mirena  IUD insertion. Indication for IUD insertion is contraception. Patient's last menstrual period was Patient's last menstrual period was 12/20/2017 (approximate).. . The patient is currently using OCPs for contraception. She is in a monogamous sexual relationship.   No results found for: PAP   pregnancy test :NEG  A complete discussion of the risks and benefits of IUD use and the details of the insertion procedure was held with the patient.   All questions were answered. A consent form was signed.   Prior to the beginning of the procedure the team paused to verify the patient's identity, as well as the procedure to be performed and the site. All equipment required was ready and available. The patient was positioned appropriately.   IUD Lot # [id number]  NDC # 47249-018-94 USE ONLY FOR MIRENA    The patient was placed in low lithotomy. A bimanual exam was performed.. A speculum was placed and the cervix swabbed with Betadine. A tenaculum was placed on the anterior cervical lip. The uterus sounded to 7.5cm. The Mirena IUD was placed to the uterine fundus without difficulty. The strings were cut to 3 cms. The tenaculum was removed and hemostasis was ensured. The speculum was removed. The patient tolerated the procedure well.   PLAN:   The patient was asked to contact the clinic for any fever/chills/severe pelvic or abdominal pain or heavy bleeding. She was instructed in how to palpate her IUD strings.   FOLLOW-UP:   She was asked to follow up in one month for IUD check, and for her routine annual screening.       Shiva Burrell D.O.

## 2018-01-23 ENCOUNTER — OFFICE VISIT (OUTPATIENT)
Dept: FAMILY MEDICINE | Facility: CLINIC | Age: 23
End: 2018-01-23
Payer: COMMERCIAL

## 2018-01-23 VITALS
BODY MASS INDEX: 25.2 KG/M2 | HEIGHT: 63 IN | HEART RATE: 73 BPM | SYSTOLIC BLOOD PRESSURE: 126 MMHG | DIASTOLIC BLOOD PRESSURE: 82 MMHG | TEMPERATURE: 98.2 F | WEIGHT: 142.2 LBS | RESPIRATION RATE: 16 BRPM | OXYGEN SATURATION: 100 %

## 2018-01-23 DIAGNOSIS — Z30.431 IUD CHECK UP: Primary | ICD-10-CM

## 2018-01-23 PROCEDURE — 99213 OFFICE O/P EST LOW 20 MIN: CPT | Performed by: FAMILY MEDICINE

## 2018-01-23 ASSESSMENT — PAIN SCALES - GENERAL: PAINLEVEL: NO PAIN (0)

## 2018-01-23 NOTE — NURSING NOTE
"Chief Complaint   Patient presents with     IUD     Check, slight pain in right lower ab        Initial /82 (BP Location: Right arm, Patient Position: Chair, Cuff Size: Adult Regular)  Pulse 73  Temp 98.2  F (36.8  C) (Oral)  Resp 16  Ht 5' 3\" (1.6 m)  Wt 142 lb 3.2 oz (64.5 kg)  SpO2 100%  BMI 25.19 kg/m2 Estimated body mass index is 25.19 kg/(m^2) as calculated from the following:    Height as of this encounter: 5' 3\" (1.6 m).    Weight as of this encounter: 142 lb 3.2 oz (64.5 kg).  Medication Reconciliation: complete   Verito Edwards CMA  3    "

## 2018-01-23 NOTE — PROGRESS NOTES
"  SUBJECTIVE:   Yady Rodriguez is a 22 year old female who presents to clinic today for the following health issues:      Recheck of IUD placed 12/22/2017.    HPI:   Patient in for a follow up of her IUD.  This was placed 1 month ago without complication.  She has not been having any problems since its placement some spotting but resolved.    O:  /82 (BP Location: Right arm, Patient Position: Chair, Cuff Size: Adult Regular)  Pulse 73  Temp 98.2  F (36.8  C) (Oral)  Resp 16  Ht 5' 3\" (1.6 m)  Wt 142 lb 3.2 oz (64.5 kg)  SpO2 100%  BMI 25.19 kg/m2      Pelvic:  External genitalia normal, bartholin and skene glands normal, urethra normal.  Speculum inserted and cervix visualized and normal in appearance.  IUD strings visualized trimmed slightly and no portion of the IUD is visualized in the cervical canal.          A:  IUD follow up      ICD-10-CM    1. IUD check up Z30.431        P:  Will need follow up for routine healthcare.  The patient will contact me and be seen in the clinic if she starts having any pain with intercourse, or abnormal discharge.  If she starts having increased cramping with menses, she will restart using her ibuprofen 600-800mg po TID with food to decrease the risk of her expelling the IUD.  If she decides that she wants to have more children in the future, she will schedule an appointment to have the IUD removed.     Shiva Burrell D.O.    "

## 2018-01-23 NOTE — PATIENT INSTRUCTIONS
[facility]   Discharged by : Verito Edwards CMA    If you have any questions regarding your visit please contact your care team:     Team Gold                Clinic Hours Telephone Number     Dr. Winifred Marquez 7am-7pm  Monday - Thursday   7am-5pm  Fridays  [phone]   (Appointment scheduling available 24/7)     RN Line  [phone] option 2     [facility]pm Monday-Friday Saturday-Sunday- 9am-5pm     [place] -   5pm-9pm Monday-Friday Saturday-Sunday- 9am-5pm    [phone] - [place]    [phone] - [place]       For a Price Quote for your services, please call our [employer] Price Line at [phone].     What options do I have for visits at the clinic other than the traditional office visit?     To expand how we care for you, many of our providers are utilizing electronic visits (e-visits) and telephone visits, when medically appropriate, for interactions with their patients rather than a visit in the clinic. We also offer nurse visits for many medical concerns. Just like any other service, we will bill your insurance company for this type of visit based on time spent on the phone with your provider. Not all insurance companies cover these visits. Please check with your medical insurance if this type of visit is covered. You will be responsible for any charges that are not paid by your insurance.   E-visits via [email or web address]: generally incur a $35.00 fee.     Telephone visits:   Time spent on the phone: *charged based on time that is spent on the phone in increments of 10 minutes. Estimated cost:   5-10 mins $30.00   11-20 mins. $59.00   21-30 mins. $85.00     Use [email or web address] (secure email communication and access to your chart) to send your primary care provider a message or make an appointment. Ask someone on your Team how to sign up for [email or web address].      As always, Thank you for trusting us with your health care needs!      [facility]:    Scheduling Appointments  [facility]  Call: [phone]    [facility]  Call: [phone]    [facility]  Call: [phone]    For Gastroenterology referrals   [facility]   Clinics and Surgery Center, 4th Floor   [address] [place] [address]   Appointments: [phone]    WHERE TO GO FOR CARE?  Clinic    Make an appointment if you:       Are sick (cold, cough, flu, sore throat, earache or in pain).       Have a small injury (sprain, small cut, burn or broken bone).       Need a physical exam, Pap smear, vaccine or prescription refill.       Have questions about your health or medicines.    To reach us:      Call [phone] ([phone]). Open 24 hours every day. (For counseling services, call [phone].)    Log into [employer] (Visit [email or web address] to create an account.) Hospital emergency room    An emergency is a serious or life- threatening problem that must be treated right away.    Call 145 or get to the hospital if you have:      Very bad or sudden:            - Chest pain or pressure         - Bleeding         - Head or belly pain         - Dizziness or trouble seeing, walking or                          Speaking      Problems breathing      Blood in your vomit or you are coughing up blood      A major injury (knocked out, loss of a finger or limb, rape, broken bone protruding from skin)    A mental health crisis. (Or call the Mental Health Crisis line at 1-661.563.8057 or Suicide Prevention Hotline at 1-703.331.6791.)    Open 24 hours every day. You don't need an appointment.     Urgent care    Visit urgent care for sickness or small injuries when the clinic is closed. You don't need an appointment. To check hours or find an urgent care near you, visit  [email or web address]. Online care    Get online care from [facility] for more than 70 common problems, like colds, allergies and infections. Open 24 hours every day at:   [email or web address]   Need help deciding?    For advice about where to be seen, you may call your clinic and ask to speak with a nurse. We're here for you 24 hours every day.         If you are deaf or hard of hearing, please let us know. We provide many free services including sign language interpreters, oral interpreters, TTYs, telephone amplifiers, note takers and written materials.

## 2018-01-23 NOTE — MR AVS SNAPSHOT
After Visit Summary   1/23/2018    Yady Rodriguez    MRN: [id number]           Patient Information     Date Of Birth          1995        Visit Information        Provider Department      1/23/2018 12:00 PM Shiva Burrell DO [facility]        Care Instructions    [facility]   Discharged by : Verito Edwards CMA    If you have any questions regarding your visit please contact your care team:     Team Gold                Clinic Hours Telephone Number     Dr. Winifred Marquez 7am-7pm  Monday - Thursday   7am-5pm  Fridays  [phone]   (Appointment scheduling available 24/7)     RN Line  [phone] option 2     Urgent Care - [place] - 11am-9pm Monday-Friday Saturday-Sunday- 9am-5pm     [place] -   5pm-9pm Monday-Friday Saturday-Sunday- 9am-5pm    [phone] - [place]    [phone] - [place]       For a Price Quote for your services, please call our Consumer Price Line at [phone].     What options do I have for visits at the clinic other than the traditional office visit?     To expand how we care for you, many of our providers are utilizing electronic visits (e-visits) and telephone visits, when medically appropriate, for interactions with their patients rather than a visit in the clinic. We also offer nurse visits for many medical concerns. Just like any other service, we will bill your insurance company for this type of visit based on time spent on the phone with your provider. Not all insurance companies cover these visits. Please check with your medical insurance if this type of visit is covered. You will be responsible for any charges that are not paid by your insurance.   E-visits via Yunait: generally incur a $35.00 fee.     Telephone visits:   Time spent on the phone: *charged based on time that is spent  on the phone in increments of 10 minutes. Estimated cost:   5-10 mins $30.00   11-20 mins. $59.00   21-30 mins. $85.00     Use [employer] (secure email communication and access to your chart) to send your primary care provider a message or make an appointment. Ask someone on your Team how to sign up for [employer].     As always, Thank you for trusting us with your health care needs!      [facility]:    Scheduling Appointments  [facility]  Call: [phone]    [facility]  Call: [phone]    [facility]  Call: [phone]    For Gastroenterology referrals   [facility]   [facility] and [facility] Center, 4th Floor   [address] [place] [address]   Appointments: [phone]    WHERE TO GO FOR CARE?  Clinic    Make an appointment if you:       Are sick (cold, cough, flu, sore throat, earache or in pain).       Have a small injury (sprain, small cut, burn or broken bone).       Need a physical exam, Pap smear, vaccine or prescription refill.       Have questions about your health or medicines.    To reach us:      Call [phone] ([phone]). Open 24 hours every day. (For counseling services, call [phone].)    Log into [employer] at [email or web address]. (Visit Go [email or web address] to create an account.) Hospital emergency room    An emergency is a serious or life- threatening problem that must be treated right away.    Call 601 or get to the hospital if you have:      Very bad or sudden:            - Chest pain or pressure         - Bleeding         - Head or belly pain         - Dizziness or trouble seeing, walking or                          Speaking      Problems breathing      Blood in your vomit or you are coughing up blood      A major injury (knocked out, loss of a finger or limb, rape, broken bone protruding from skin)    A mental health crisis. (Or call the Mental Health Crisis line at  1-240.579.1072 or Suicide Prevention Hotline at 1-593.327.1776.)    Open 24 hours every day. You don't need an appointment.     Urgent care    Visit urgent care for sickness or small injuries when the clinic is closed. You don't need an appointment. To check hours or find an urgent care near you, visit [email or web address]. Online care    Get online care from [facility] for more than 70 common problems, like colds, allergies and infections. Open 24 hours every day at:   [email or web address]   Need help deciding?    For advice about where to be seen, you may call your clinic and ask to speak with a nurse. We're here for you 24 hours every day.         If you are deaf or hard of hearing, please let us know. We provide many free services including sign language interpreters, oral interpreters, TTYs, telephone amplifiers, note takers and written materials.                   Follow-ups after your visit        Who to contact     If you have questions or need follow up information about today's clinic visit or your schedule please contact [facility] directly at [phone].  Normal or non-critical lab and imaging results will be communicated to you by OpenSearchServerhart, letter or phone within 4 business days after the clinic has received the results. If you do not hear from us within 7 days, please contact the clinic through Planet DDSt or phone. If you have a critical or abnormal lab result, we will notify you by phone as soon as possible.  Submit refill requests through Outernet or call your pharmacy and they will forward the refill request to us. Please allow 3 business days for your refill to be completed.          Additional Information About Your Visit        Outernet Information     Out[employer]et gives you secure access to your electronic health record. If you see a primary care provider, you can also send messages to your care team and make appointments. If you have questions, please call your primary care clinic.  If you do not  have a primary care provider, please call [phone] and they will assist you.        Care EveryWhere ID     This is your Care EveryWhere ID. This could be used by other organizations to access your [place] medical records  [id number]         Blood Pressure from Last 3 Encounters:   12/22/17 134/84   11/08/17 (!) 144/94   10/20/17 135/88    Weight from Last 3 Encounters:   12/22/17 142 lb (64.4 kg)   11/08/17 141 lb (64 kg)   10/17/17 142 lb (64.4 kg)              Today, you had the following     No orders found for display       Primary Care Provider Office Phone # Fax #    Shiva Burrell [phone]       [facility] [address]        Equal Access to Services     ROMULO SOLORIO : Hadii sissy jones hadasho Soeben, waaxda luqadaha, qaybta kaalmada adealanyada, sarah rosales . So [facility] [phone].    ATENCIÓN: Si habla español, tiene a torres disposición servicios gratuitos de asistencia lingüística. Merrill al [phone].    We comply with applicable federal civil rights laws and [place] laws. We do not discriminate on the basis of race, color, national origin, age, disability, sex, sexual orientation, or gender identity.            Thank you!     Thank you for choosing [facility]  for your care. Our goal is always to provide you with excellent care. Hearing back from our patients is one way we can continue to improve our services. Please take a few minutes to complete the written survey that you may receive in the mail after your visit with us. Thank you!             Your Updated Medication List - Protect others around you: Learn how to safely use, store and throw away your medicines at www.disposemymeds.org.          This list is accurate as of: 1/23/18 12:03 PM.  Always use your most recent med list.                   Brand Name Dispense Instructions for use Diagnosis    etonogestrel-ethinyl estradiol 0.12-0.015 MG/24HR  vaginal ring    NUVARING    1 each    Place 1 each vaginally every 28 days    Encounter for initial prescription of vaginal ring hormonal contraceptive       levonorgestrel 20 MCG/24HR IUD    MIRENA    1 each    1 each (20 mcg) by Intrauterine route once for 1 dose    Encounter for IUD insertion

## 2018-09-27 ENCOUNTER — ALLIED HEALTH/NURSE VISIT (OUTPATIENT)
Dept: FAMILY MEDICINE | Facility: CLINIC | Age: 23
End: 2018-09-27
Payer: COMMERCIAL

## 2018-09-27 VITALS — HEART RATE: 84 BPM | DIASTOLIC BLOOD PRESSURE: 89 MMHG | SYSTOLIC BLOOD PRESSURE: 133 MMHG

## 2018-09-27 DIAGNOSIS — Z01.30 BP CHECK: Primary | ICD-10-CM

## 2018-09-27 PROCEDURE — 99207 ZZC NO CHARGE NURSE ONLY: CPT | Performed by: FAMILY MEDICINE

## 2018-09-27 NOTE — PROGRESS NOTES
Yady Rodriguez is enrolled/participating in the retail pharmacy Blood Pressure Goals Achievement Program (BPGAP).  Yady Rodriguez was evaluated at [facility] on September 27, 2018 at which time her blood pressure was:    BP Readings from Last 3 Encounters:   09/27/18 133/89   01/23/18 126/82   12/22/17 134/84     Reviewed lifestyle modifications for blood pressure control and reduction: including making healthy food choices, managing weight, getting regular exercise, smoking cessation, reducing alcohol consumption, monitoring blood pressure regularly.     Yady Rodriguez is not experiencing symptoms.    Follow-Up: BP is at goal of < 140/90mmHg (patient 18+ years of age with or without diabetes).  Recommended follow-up at pharmacy in 6 months.     Recommendation to Provider: none    Yady Rodriguez was evaluated for enrollment into the PGEN study today.    PLEASE INITIATE ENROLLMENT DISCUSSION WITH HTN PTS  1) Between 30-80 years old                                                                                                               2) BMI between 19-50                                                                                                        3) BP ?140/90 AND ?170/110 patients aged 30-59         BP ?150/90 AND ?170/110 non-diabetic patients aged 60-80       BP ?140/90 AND ?170/110 diabetic patients aged 60-80  4) Additional requirements for uncontrolled HTN patients:        Pt on only 1 class of medication  5) EXCLUDE patient if confirmation of:                  ? Cardiac disease                  ? Chronic Kidney Disease                  ? Pregnancy/Breastfeeding                  ? Secondary Hypertension/Pre-eclampsia                                 ? Vascular disease    Patient eligible for enrollment:  No  Patient interested in enrollment:  No    This note completed by:   Cari Jennings  PharmD Candidate 2019  [phone]    Gale PrabhakarD, [facility]  Pharmacist Manager   [facility]  [facility]  [phone]

## 2018-09-27 NOTE — MR AVS SNAPSHOT
After Visit Summary   9/27/2018    Yady Rodriguez    MRN: [id number]           Patient Information     Date Of Birth          1995        Visit Information        Provider Department      9/27/2018 1:59 PM Shiva Burrell DO [facility]        Today's Diagnoses     BP check    -  1       Follow-ups after your visit        Who to contact     If you have questions or need follow up information about today's clinic visit or your schedule please contact [facility] directly at [phone].  Normal or non-critical lab and imaging results will be communicated to you by JamOriginhart, letter or phone within 4 business days after the clinic has received the results. If you do not hear from us within 7 days, please contact the clinic through Changerst or phone. If you have a critical or abnormal lab result, we will notify you by phone as soon as possible.  Submit refill requests through Habeas or call your pharmacy and they will forward the refill request to us. Please allow 3 business days for your refill to be completed.          Additional Information About Your Visit        MyChart Information     Habeas gives you secure access to your electronic health record. If you see a primary care provider, you can also send messages to your care team and make appointments. If you have questions, please call your primary care clinic.  If you do not have a primary care provider, please call [phone] and they will assist you.        Care EveryWhere ID     This is your Care EveryWhere ID. This could be used by other organizations to access your [place] medical records  [id number]        Your Vitals Were     Pulse                   84            Blood Pressure from Last 3 Encounters:   09/27/18 133/89   01/23/18 126/82   12/22/17 134/84    Weight from Last 3 Encounters:   01/23/18 142 lb 3.2 oz (64.5 kg)   12/22/17 142 lb (64.4 kg)   11/08/17 141 lb (64 kg)               Today, you had the following     No orders found for display       Primary Care Provider Office Phone # Fax #    Shiva Burrell [phone]       [address] [facility] [address]        Equal Access to Services     ROMULO SOLORIO : Hadii aad ku hadvalentinroseline Soomaali, waaxda luqadaha, qaybta kaalmada adeegyada, waxarleth idiin hayjanellen adealan meeramarianela aguirre. So [facility] [phone].    ATENCIÓN: Si habla español, tiene a torres disposición servicios gratuitos de asistencia lingüística. Llame al [phone].    We comply with applicable federal civil rights laws and [place] laws. We do not discriminate on the basis of race, color, national origin, age, disability, sex, sexual orientation, or gender identity.            Thank you!     Thank you for choosing [facility]  for your care. Our goal is always to provide you with excellent care. Hearing back from our patients is one way we can continue to improve our services. Please take a few minutes to complete the written survey that you may receive in the mail after your visit with us. Thank you!             Your Updated Medication List - Protect others around you: Learn how to safely use, store and throw away your medicines at www.disposemymeds.org.          This list is accurate as of 9/27/18  2:02 PM.  Always use your most recent med list.                   Brand Name Dispense Instructions for use Diagnosis    etonogestrel-ethinyl estradiol 0.12-0.015 MG/24HR vaginal ring    NUVARING    1 each    Place 1 each vaginally every 28 days    Encounter for initial prescription of vaginal ring hormonal contraceptive       MIRENA (52 MG) 20 MCG/24HR IUD   Generic drug:  levonorgestrel      1 each by Intrauterine route once

## 2018-11-13 ENCOUNTER — HEALTH MAINTENANCE LETTER (OUTPATIENT)
Age: 23
End: 2018-11-13

## 2018-11-26 ENCOUNTER — OFFICE VISIT (OUTPATIENT)
Dept: FAMILY MEDICINE | Facility: CLINIC | Age: 23
End: 2018-11-26
Payer: COMMERCIAL

## 2018-11-26 ENCOUNTER — TELEPHONE (OUTPATIENT)
Dept: FAMILY MEDICINE | Facility: CLINIC | Age: 23
End: 2018-11-26

## 2018-11-26 VITALS
SYSTOLIC BLOOD PRESSURE: 130 MMHG | HEIGHT: 63 IN | HEART RATE: 74 BPM | WEIGHT: 124 LBS | BODY MASS INDEX: 21.97 KG/M2 | TEMPERATURE: 98 F | DIASTOLIC BLOOD PRESSURE: 78 MMHG

## 2018-11-26 DIAGNOSIS — R03.0 ELEVATED BLOOD PRESSURE READING WITHOUT DIAGNOSIS OF HYPERTENSION: ICD-10-CM

## 2018-11-26 DIAGNOSIS — Z73.4 IMPAIRED SOCIAL INTERACTION: ICD-10-CM

## 2018-11-26 DIAGNOSIS — N76.0 BV (BACTERIAL VAGINOSIS): ICD-10-CM

## 2018-11-26 DIAGNOSIS — Z00.01 ENCOUNTER FOR ROUTINE ADULT HEALTH EXAMINATION WITH ABNORMAL FINDINGS: Primary | ICD-10-CM

## 2018-11-26 DIAGNOSIS — R10.2 PELVIC PAIN IN FEMALE: ICD-10-CM

## 2018-11-26 DIAGNOSIS — B96.89 BV (BACTERIAL VAGINOSIS): ICD-10-CM

## 2018-11-26 DIAGNOSIS — Z11.3 SCREEN FOR STD (SEXUALLY TRANSMITTED DISEASE): ICD-10-CM

## 2018-11-26 DIAGNOSIS — Z13.220 LIPID SCREENING: ICD-10-CM

## 2018-11-26 DIAGNOSIS — Z97.5 IUD (INTRAUTERINE DEVICE) IN PLACE: ICD-10-CM

## 2018-11-26 DIAGNOSIS — Z13.29 SCREENING FOR THYROID DISORDER: ICD-10-CM

## 2018-11-26 DIAGNOSIS — F41.8 DEPRESSION WITH ANXIETY: ICD-10-CM

## 2018-11-26 DIAGNOSIS — Z11.4 SCREENING FOR HIV (HUMAN IMMUNODEFICIENCY VIRUS): ICD-10-CM

## 2018-11-26 DIAGNOSIS — L70.0 ACNE VULGARIS: ICD-10-CM

## 2018-11-26 DIAGNOSIS — Z12.4 SCREENING FOR MALIGNANT NEOPLASM OF CERVIX: ICD-10-CM

## 2018-11-26 DIAGNOSIS — Z83.42 FAMILY HISTORY OF HIGH CHOLESTEROL: ICD-10-CM

## 2018-11-26 LAB
SPECIMEN SOURCE: ABNORMAL
WET PREP SPEC: ABNORMAL

## 2018-11-26 PROCEDURE — 87210 SMEAR WET MOUNT SALINE/INK: CPT | Performed by: FAMILY MEDICINE

## 2018-11-26 PROCEDURE — 99395 PREV VISIT EST AGE 18-39: CPT | Performed by: FAMILY MEDICINE

## 2018-11-26 PROCEDURE — 87491 CHLMYD TRACH DNA AMP PROBE: CPT | Performed by: FAMILY MEDICINE

## 2018-11-26 PROCEDURE — 99214 OFFICE O/P EST MOD 30 MIN: CPT | Mod: 25 | Performed by: FAMILY MEDICINE

## 2018-11-26 PROCEDURE — 87591 N.GONORRHOEAE DNA AMP PROB: CPT | Performed by: FAMILY MEDICINE

## 2018-11-26 PROCEDURE — G0145 SCR C/V CYTO,THINLAYER,RESCR: HCPCS | Performed by: FAMILY MEDICINE

## 2018-11-26 RX ORDER — BENZOYL PEROXIDE 5 G/100G
GEL TOPICAL 2 TIMES DAILY
Qty: 90 G | Refills: 1 | Status: SHIPPED | OUTPATIENT
Start: 2018-11-26 | End: 2021-03-31

## 2018-11-26 RX ORDER — CLINDAMYCIN PHOSPHATE 10 MG/G
GEL TOPICAL DAILY
Qty: 60 G | Refills: 11 | Status: SHIPPED | OUTPATIENT
Start: 2018-11-26 | End: 2021-03-31

## 2018-11-26 RX ORDER — METRONIDAZOLE 7.5 MG/G
1 GEL VAGINAL AT BEDTIME
Qty: 70 G | Refills: 0 | Status: SHIPPED | OUTPATIENT
Start: 2018-11-26 | End: 2018-12-01

## 2018-11-26 ASSESSMENT — ENCOUNTER SYMPTOMS
COUGH: 0
HEARTBURN: 0
NAUSEA: 0
ABDOMINAL PAIN: 0
EYE PAIN: 0
PALPITATIONS: 0
SORE THROAT: 0
ARTHRALGIAS: 0
JOINT SWELLING: 0
PARESTHESIAS: 0
FEVER: 0
HEMATURIA: 0
FREQUENCY: 0
DIARRHEA: 0
HEADACHES: 0
CHILLS: 0
NERVOUS/ANXIOUS: 0
HEMATOCHEZIA: 0
SHORTNESS OF BREATH: 0
CONSTIPATION: 0
WEAKNESS: 0
MYALGIAS: 0
DYSURIA: 0
BREAST MASS: 0
DIZZINESS: 0

## 2018-11-26 NOTE — MR AVS SNAPSHOT
After Visit Summary   11/26/2018    Yady Rodriguez    MRN: [id number]           Patient Information     Date Of Birth          1995        Visit Information        Provider Department      11/26/2018 11:40 AM Shiva Burrell DO [facility]        Today's Diagnoses     Elevated blood pressure reading without diagnosis of hypertension    -  1    Encounter for routine adult health examination with abnormal findings        Depression with anxiety        Screening for malignant neoplasm of cervix        Screening for HIV (human immunodeficiency virus)        Family history of high cholesterol        Screening for thyroid disorder        Pelvic pain in female        Acne vulgaris        Screen for STD (sexually transmitted disease)        Lipid screening        Impaired social interaction          Care Instructions    Please purchase a blood pressure machine to close monitor blood pressures outside the clinic at home. Please bring blood pressure to the pharmacy for calibration. Monitor blood pressures 10 to 15 minutes after resting. If after 2 weeks blood pressures remain elevated in the 130s and 140s, start propanolol as directed and notify me if you start this medication.       If blood pressures are in the at goal range in the 120s/80s, you may follow up with nurse only visit. However, I would like you to follow up with me after two months if in the 130s and 140s.     Please return to clinic for fasting labs at your earliest convenience.      For acne, use topical gel as directed.     Follow up with mental health as planned.       Preventive Health Recommendations  Female Ages 21 to 25     Yearly exam:     See your health care provider every year in order to  o Review health changes.   o Discuss preventive care.    o Review your medicines if your doctor has prescribed any.      You should be tested each year for STDs (sexually transmitted diseases).       Talk to your  provider about how often you should have cholesterol testing.      Get a Pap test every three years. If you have an abnormal result, your doctor may have you test more often.      If you are at risk for diabetes, you should have a diabetes test (fasting glucose).     Shots:     Get a flu shot each year.     Get a tetanus shot every 10 years.     Consider getting the shot (vaccine) that prevents cervical cancer (Gardasil).    Nutrition:     Eat at least 5 servings of fruits and vegetables each day.    Eat whole-grain bread, whole-wheat pasta and brown rice instead of white grains and rice.    Get adequate Calcium and Vitamin D.     Lifestyle    Exercise at least 150 minutes a week each week (30 minutes a day, 5 days a week). This will help you control your weight and prevent disease.    Limit alcohol to one drink per day.    No smoking.     Wear sunscreen to prevent skin cancer.  See your dentist every six months for an exam and cleaning.[facility]   Discharged by : Indira Jalloh MA    Paper scripts provided to patient : no     If you have any questions regarding your visit please contact your care team:     Team Gold                Clinic Hours Telephone Number     Dr. Winifred Whitlock, CNP  Monica Viveros, CNP 7am-7pm  Monday - Thursday   7am-5pm  Fridays  [phone]   (Appointment scheduling available 24/7)     RN Line  [phone] option 2     Urgent Care - [place] - 11am-9pm Monday-Friday Saturday-Sunday- 9am-5pm     [place] -   5pm-9pm Monday-Friday Saturday-Sunday- 9am-5pm    [phone] - [place]    [phone] - [place]       For a Price Quote for your services, please call our Consumer Price Line at [phone].     What options do I have for visits at the clinic other than the traditional office visit?     To expand how we care for you, many of our providers are utilizing  electronic visits (e-visits) and telephone visits, when medically appropriate, for interactions with their patients rather than a visit in the clinic. We also offer nurse visits for many medical concerns. Just like any other service, we will bill your insurance company for this type of visit based on time spent on the phone with your provider. Not all insurance companies cover these visits. Please check with your medical insurance if this type of visit is covered. You will be responsible for any charges that are not paid by your insurance.   E-visits via TigerTradet: generally incur a $35.00 fee.     Telephone visits:  Time spent on the phone: *charged based on time that is spent on the phone in increments of 10 minutes. Estimated cost:   5-10 mins $30.00   11-20 mins. $59.00   21-30 mins. $85.00       Use [employer] (secure email communication and access to your chart) to send your primary care provider a message or make an appointment. Ask someone on your Team how to sign up for [employer].     As always, Thank you for trusting us with your health care needs!      [facility]:    Scheduling Appointments  [facility]  Call: [phone]    [facility]  Call: [phone]    [facility]  Call: [phone]    For Gastroenterology referrals   [facility] Gastroenterology   Clinics and Surgery Center, 4th Floor   [address] [place] [address]   Appointments: [phone]    WHERE TO GO FOR CARE?  Clinic    Make an appointment if you:     Are sick (cold, cough, flu, sore throat, earache or in pain).     Have a small injury (sprain, small cut, burn or broken bone).     Need a physical exam, Pap smear, vaccine or prescription refill.     Have questions about your health or medicines.    To reach us:    Call [phone] ([phone]). Open 24 hours every day. (For counseling services, call [phone].)  Log into [employer] at  [email or web address]. (Visit [email or web address] to create an account.) Hospital emergency room    An emergency is a serious or life- threatening problem that must be treated right away.    Call 911 or get to the hospital if you have:    Very bad or sudden:            - Chest pain or pressure         - Bleeding         - Head or belly pain         - Dizziness or trouble seeing, walking or                          Speaking    Problems breathing    Blood in your vomit or you are coughing up blood    A major injury (knocked out, loss of a finger or limb, rape, broken bone protruding from skin)  A mental health crisis. (Or call the Mental Health Crisis line at 1-714.598.8801 or Suicide Prevention Hotline at 1-392.418.2791.)    Open 24 hours every day. You don't need an appointment.     Urgent care    Visit urgent care for sickness or small injuries when the clinic is closed. You don't need an appointment. To check hours or find an urgent care near you, visit [email or web address]. Online care    Get online care from [employer] for more than 70 common problems, like colds, allergies and infections. Open 24 hours every day at:   [email or web address]   Need help deciding?    For advice about where to be seen, you may call your clinic and ask to speak with a nurse. We're here for you 24 hours every day.         If you are deaf or hard of hearing, please let us know. We provide many free services including sign language interpreters, oral interpreters, TTYs, telephone amplifiers, note takers and written materials.                       Follow-ups after your visit        Additional Services     MENTAL HEALTH REFERRAL  - Adult; Assessments and Testing; General Psychological Testing; Other: [facility] Healthcare Providers [phone]; We will contact you to schedule the appointment or please call with any questions       All scheduling is subject to the client's specific insurance plan & benefits, provider/location availability, and  provider clinical specialities.  Please arrive 15 minutes early for your first appointment and bring your completed paperwork.    Please be aware that coverage of these services is subject to the terms and limitations of your health insurance plan.  Call member services at your health plan with any benefit or coverage questions.                            Follow-up notes from your care team     Return in about 2 months (around 1/26/2019) for BP Recheck.      Future tests that were ordered for you today     Open Future Orders        Priority Expected Expires Ordered    TSH with free T4 reflex Routine  2/26/2019 11/26/2018    Hemoglobin Routine  2/26/2019 11/26/2018    HIV Antigen Antibody Combo Routine  2/26/2019 11/26/2018    Lipid panel reflex to direct LDL Fasting Routine  2/26/2019 11/26/2018    Basic metabolic panel Routine  2/26/2019 11/26/2018            Who to contact     If you have questions or need follow up information about today's clinic visit or your schedule please contact [facility] directly at [phone].  Normal or non-critical lab and imaging results will be communicated to you by [employer], letter or phone within 4 business days after the clinic has received the results. If you do not hear from us within 7 days, please contact the clinic through SHARKMARXt or phone. If you have a critical or abnormal lab result, we will notify you by phone as soon as possible.  Submit refill requests through [employer] or call your pharmacy and they will forward the refill request to us. Please allow 3 business days for your refill to be completed.          Additional Information About Your Visit        [employer] Information     [employer] gives you secure access to your electronic health record. If you see a primary care provider, you can also send messages to your care team and make appointments. If you have questions, please call your primary care clinic.  If you do not have a primary care provider,  "please call [phone] and they will assist you.        Care EveryWhere ID     This is your Care EveryWhere ID. This could be used by other organizations to access your [facility] medical records  [id number]        Your Vitals Were     Pulse Temperature Height BMI (Body Mass Index)          74 98  F (36.7  C) (Oral) 5' 3\" (1.6 m) 21.97 kg/m2         Blood Pressure from Last 3 Encounters:   11/26/18 130/78   09/27/18 133/89   01/23/18 126/82    Weight from Last 3 Encounters:   11/26/18 124 lb (56.2 kg)   01/23/18 142 lb 3.2 oz (64.5 kg)   12/22/17 142 lb (64.4 kg)              We Performed the Following     MENTAL HEALTH REFERRAL  - Adult; Assessments and Testing; General Psychological Testing; Other: Behavioral Healthcare Providers [phone]; We will contact you to schedule the appointment or please call with any questions     Pap imaged thin layer screen reflex to HPV if ASCUS - recommend age 25 - 29     Treponema Abs w Reflex to RPR and Titer     Wet prep          Today's Medication Changes          These changes are accurate as of 11/26/18 12:15 PM.  If you have any questions, ask your nurse or doctor.               Start taking these medicines.        Dose/Directions    benzoyl peroxide 5 % topical gel   Used for:  Acne vulgaris   Started by:  Shiva Burrell DO        Apply topically 2 times daily   Quantity:  90 g   Refills:  1       clindamycin 1 % topical gel   Commonly known as:  CLINDAGEL   Used for:  Acne vulgaris   Started by:  Shiva Burrell DO        Apply topically daily   Quantity:  60 g   Refills:  11         Stop taking these medicines if you haven't already. Please contact your care team if you have questions.     etonogestrel-ethinyl estradiol 0.12-0.015 MG/24HR vaginal ring   Commonly known as:  NUVARING   Stopped by:  Shiva Burrell DO                Where to get your medicines      These medications were sent to [facility] [place], [place] - [address] [place] [address]"  [address] [facility] [address]     Phone:  [phone]     benzoyl peroxide 5 % topical gel    clindamycin 1 % topical gel                Primary Care Provider Office Phone # Fax #    Shiva Burrell [phone] [address] [facility] [address]        Equal Access to Services     ROMULO SOLORIO : Hadii aad ku hadasho Soomaali, waaxda luqadaha, qaybta kaalmada adeegyada, waxay idiin hayaan adeeg kharash la'janellen ah. So [facility] [phone].    ATENCIÓN: Si habla español, tiene a torres disposición servicios gratuitos de asistencia lingüística. Martiname al [phone].    We comply with applicable federal civil rights laws and [place] laws. We do not discriminate on the basis of race, color, national origin, age, disability, sex, sexual orientation, or gender identity.            Thank you!     Thank you for choosing [facility]  for your care. Our goal is always to provide you with excellent care. Hearing back from our patients is one way we can continue to improve our services. Please take a few minutes to complete the written survey that you may receive in the mail after your visit with us. Thank you!             Your Updated Medication List - Protect others around you: Learn how to safely use, store and throw away your medicines at www.disposemymeds.org.          This list is accurate as of 11/26/18 12:15 PM.  Always use your most recent med list.                   Brand Name Dispense Instructions for use Diagnosis    benzoyl peroxide 5 % topical gel     90 g    Apply topically 2 times daily    Acne vulgaris       clindamycin 1 % topical gel    CLINDAGEL    60 g    Apply topically daily    Acne vulgaris       MIRENA (52 MG) 20 MCG/24HR IUD   Generic drug:  levonorgestrel      1 each by Intrauterine route once

## 2018-11-26 NOTE — TELEPHONE ENCOUNTER
"Patient leaves  on RN line, states that she saw Dr. Burrell today and is following-up to a call from Dr. Burrell regarding a medication.  She states she would go with the \"first option, the one with fewer side effects.\"  Chart reviewed and appears this is for BV, and she would prefer the vaginal gel.  It appears Dr. Burrell did send a script for this today, so patient notified of this, voices understanding.    Deysi Reese RN    "

## 2018-11-26 NOTE — LETTER
[address] [address] [phone]                                                                                                December 10, 2018    Gaviota Rodriguez  [address]  [facility] [address]        Dear Ms. Rodriguez,    The results of your recent lab tests were within normal limits. Enclosed is a copy of these results.  If you have any further questions or problems, please contact our office.  Results for orders placed or performed in visit on 11/26/18   Pap imaged thin layer screen reflex to HPV if ASCUS - recommend age 25 - 29   Result Value Ref Range    PAP NIL     Copath Report         Patient Name: GAVIOTA RODRIGUEZ  MR#: [id number]  Specimen #: [id number]  Collected: 11/26/2018  Received: 11/27/2018  Reported: 11/28/2018 10:21  Ordering Phy(s): JOHN KURTZ    For improved result formatting, select 'View Enhanced Report Format' under   Linked Documents section.    SPECIMEN/STAIN PROCESS:  Pap imaged thin layer prep screening (Surepath, FocalPoint with guided   screening)       Pap-Cyto x 1, Pap with reflex to HPV if ASCUS x 1    SOURCE: Cervical, endocervical  ----------------------------------------------------------------   Pap imaged thin layer prep screening (Surepath, FocalPoint with guided   screening)  SPECIMEN ADEQUACY:  Satisfactory for evaluation.  -Transformation zone component present.    CYTOLOGIC INTERPRETATION:    Negative for intraepithelial lesion or malignancy    Electronically signed out by:  KHURRAM Zaman (ASCP)    Processed and screened at [facility] [place],   [facility]    CLINICAL HISTORY:     Currently not having periods, Intra-Uterine Device,    Papanicolaou Test Limitations:  Cervical cytology is a screening test with   limited sensitivity; regular  screening is critical for cancer prevention; Pap tests are primarily   effective for the diagnosis/prevention  of  squamous cell carcinoma, not adenocarcinomas or other cancers.    TESTING LAB LOCATION:  [address]  [phone]    COLLECTION SITE:  Client:  [facility] [place]  Location: [facility] (B)     Wet prep   Result Value Ref Range    Specimen Description Vagina     Wet Prep No Trichomonas seen     Wet Prep Clue cells seen (A)     Wet Prep No yeast seen    Chlamydia trachomatis PCR   Result Value Ref Range    Specimen Description Cervix     Chlamydia Trachomatis PCR Negative NEG^Negative   Neisseria gonorrhoeae PCR   Result Value Ref Range    Specimen Descrip Cervix     N Gonorrhea PCR Negative NEG^Negative     Sincerely,      Shiva Burrell, DO/mv

## 2018-11-26 NOTE — TELEPHONE ENCOUNTER
Reason for Call:  Other / Medications questions (benzoyl peroxide, clindamycin (CLINDAGEL)  and metroNIDAZOLE (METROGEL)    Detailed comments: [facility] called and stated that patient's insurance is not approving these medications because of the prescribing doctor and they would like to know if there is another doctor that could prescribe.  Please call pharmacy if further information/questions.    Phone Number Patient can be reached at: [phone] (Critical access hospital with [facility])    Best Time: Anytime    Can we leave a detailed message on this number? YES    Call taken on 11/26/2018 at 5:50 PM by Kierra Manuel

## 2018-11-26 NOTE — PATIENT INSTRUCTIONS
Please purchase a blood pressure machine to close monitor blood pressures outside the clinic at home. Please bring blood pressure to the pharmacy for calibration. Monitor blood pressures 10 to 15 minutes after resting. If after 2 weeks blood pressures remain elevated in the 130s and 140s, start propanolol as directed and notify me if you start this medication.       If blood pressures are in the at goal range in the 120s/80s, you may follow up with nurse only visit. However, I would like you to follow up with me after two months if in the 130s and 140s.     Please return to clinic for fasting labs at your earliest convenience.      For acne, use topical gel as directed.     Follow up with mental health as planned.       Preventive Health Recommendations  Female Ages 21 to 25     Yearly exam:     See your health care provider every year in order to  o Review health changes.   o Discuss preventive care.    o Review your medicines if your doctor has prescribed any.      You should be tested each year for STDs (sexually transmitted diseases).       Talk to your provider about how often you should have cholesterol testing.      Get a Pap test every three years. If you have an abnormal result, your doctor may have you test more often.      If you are at risk for diabetes, you should have a diabetes test (fasting glucose).     Shots:     Get a flu shot each year.     Get a tetanus shot every 10 years.     Consider getting the shot (vaccine) that prevents cervical cancer (Gardasil).    Nutrition:     Eat at least 5 servings of fruits and vegetables each day.    Eat whole-grain bread, whole-wheat pasta and brown rice instead of white grains and rice.    Get adequate Calcium and Vitamin D.     Lifestyle    Exercise at least 150 minutes a week each week (30 minutes a day, 5 days a week). This will help you control your weight and prevent disease.    Limit alcohol to one drink per day.    No smoking.     Wear sunscreen to  prevent skin cancer.  See your dentist every six months for an exam and cleaning.[facility]   Discharged by : Indira Jalloh MA    Paper scripts provided to patient : no     If you have any questions regarding your visit please contact your care team:     Team Gold                Clinic Hours Telephone Number     Dr. Winifred Whitlock, CNP  Monica Jarquinter, CNP 7am-7pm  Monday - Thursday   7am-5pm  Fridays  [phone]   (Appointment scheduling available 24/7)     RN Line  [phone] option 2     [facility] [place] [facility] - 11am-9pm Monday-Friday Saturday-Sunday- 9am-5pm     [place] -   5pm-9pm Monday-Friday Saturday-Sunday- 9am-5pm    [phone] - [place]    [phone] - [place]       For a Price Quote for your services, please call our Consumer Price Line at [phone].     What options do I have for visits at the clinic other than the traditional office visit?     To expand how we care for you, many of our providers are utilizing electronic visits (e-visits) and telephone visits, when medically appropriate, for interactions with their patients rather than a visit in the clinic. We also offer nurse visits for many medical concerns. Just like any other service, we will bill your insurance company for this type of visit based on time spent on the phone with your provider. Not all insurance companies cover these visits. Please check with your medical insurance if this type of visit is covered. You will be responsible for any charges that are not paid by your insurance.   E-visits via Disrupt6: generally incur a $35.00 fee.     Telephone visits:  Time spent on the phone: *charged based on time that is spent on the phone in increments of 10 minutes. Estimated cost:   5-10 mins $30.00   11-20 mins. $59.00   21-30 mins. $85.00       Use Dis[employer] (secure email communication and access to your  chart) to send your primary care provider a message or make an appointment. Ask someone on your Team how to sign up for MapMyID.     As always, Thank you for trusting us with your health care needs!      [facility]:    Scheduling Appointments  [place] [place]  Call: [phone]    [facility]  Call: [phone]    [facility]  Call: [phone]    For Gastroenterology referrals   [facility] Gastroenterology   Clinics and Surgery [place], 4th Floor   [address] [place] [address]   Appointments: [phone]    WHERE TO GO FOR CARE?  Clinic    Make an appointment if you:     Are sick (cold, cough, flu, sore throat, earache or in pain).     Have a small injury (sprain, small cut, burn or broken bone).     Need a physical exam, Pap smear, vaccine or prescription refill.     Have questions about your health or medicines.    To reach us:    Call [phone] ([phone]). Open 24 hours every day. (For counseling services, call [phone].)  Log into MapMyID at [email or web address]. (Visit [email or web address] to create an account.) Hospital emergency room    An emergency is a serious or life- threatening problem that must be treated right away.    Call [phone] or get to the hospital if you have:    Very bad or sudden:            - Chest pain or pressure         - Bleeding         - Head or belly pain         - Dizziness or trouble seeing, walking or                          Speaking    Problems breathing    Blood in your vomit or you are coughing up blood    A major injury (knocked out, loss of a finger or limb, rape, broken bone protruding from skin)  A mental health crisis. (Or call the Mental Health Crisis line at 1-389.278.9115 or Suicide Prevention Hotline at 1-881.708.1946.)    Open 24 hours every day. You don't need an appointment.     Urgent care    Visit urgent care for sickness or small injuries when the  clinic is closed. You don't need an appointment. To check hours or find an urgent care near you, visit [email or web address]. Online care    Get online care from [facility] for more than 70 common problems, like colds, allergies and infections. Open 24 hours every day at:   [email or web address]   Need help deciding?    For advice about where to be seen, you may call your clinic and ask to speak with a nurse. We're here for you 24 hours every day.         If you are deaf or hard of hearing, please let us know. We provide many free services including sign language interpreters, oral interpreters, TTYs, telephone amplifiers, note takers and written materials.

## 2018-11-26 NOTE — PROGRESS NOTES
SUBJECTIVE:   CC: Yady Rodriguez is an 23 year old woman who presents for preventive health visit.     Physical   Annual:     Getting at least 3 servings of Calcium per day:  NO    Bi-annual eye exam:  NO    Dental care twice a year:  NO    Sleep apnea or symptoms of sleep apnea:  None    Diet:  Other    Frequency of exercise:  1 day/week    Duration of exercise:  N/A    Taking medications regularly:  No    Barriers to taking medications:  Cost of medication and Problems remembering to take them    Additional concerns today:  Yes      She is not fasting today. No STD concerns.   HIV CDC guidelines discussed with patient. Patient would like HIV screening with future lab orders. .       Has Mirena, got it last year. Pain on and off in ovary. Right side. Noted to be rhythmic. This has improved since MIRENA. Had severe acne flare in the first part of the year. She has uses peroxide base face wash and salicylic acid with toleration.  Tried Differin and Retin-A in the past.  However, she states that she picks at her face.     Reports that at last physical she had high blood pressure, rechecked mid year and was still high. Mom has high blood pressure and high cholesterol. She reports that her mother and maternal grandmother also have elevated blood pressures.       She endorses history of anxiety and depression   Depression is okay. Anxiety is off and on. In the past she stated that she was told she was socially awkward. She stated that she does well in school, but noted that she has trouble focusing at times. She endorses that she is socially and emotionally awkward in school and around others.    In the past she stated that she was recommended to follow up with an ASD assessment, but did not do this. Patient would like another referral today.   Today's PHQ-2 Score:   PHQ-2 ( 1999 [employer]) 11/26/2018   Q1: Little interest or pleasure in doing things 1   Q2: Feeling down, depressed or hopeless 0   PHQ-2 Score 1   Q1:  Little interest or pleasure in doing things Several days   Q2: Feeling down, depressed or hopeless Not at all   PHQ-2 Score 1       Abuse: Current or Past(Physical, Sexual or Emotional)- No  Do you feel safe in your environment - Yes    Social History   Substance Use Topics     Smoking status: Former Smoker     Smokeless tobacco: Never Used     Alcohol use Yes      Comment: 2-3 drinks a week      Alcohol Use 11/26/2018   If you drink alcohol do you typically have greater than 3 drinks per day OR greater than 7 drinks per week? No       Reviewed orders with patient.  Reviewed health maintenance and updated orders accordingly - Yes  Labs reviewed in EPIC  BP Readings from Last 3 Encounters:   11/26/18 130/78   09/27/18 133/89   01/23/18 126/82    Wt Readings from Last 3 Encounters:   11/26/18 124 lb (56.2 kg)   01/23/18 142 lb 3.2 oz (64.5 kg)   12/22/17 142 lb (64.4 kg)                  Patient Active Problem List   Diagnosis     Elevated blood pressure reading without diagnosis of hypertension     IUD (intrauterine device) in place     Acne vulgaris     No past surgical history on file.    Social History   Substance Use Topics     Smoking status: Former Smoker     Smokeless tobacco: Never Used     Alcohol use Yes      Comment: 2-3 drinks a week      Family History   Problem Relation Age of Onset     Endometrial Cancer Mother      Hypertension Mother      Hyperlipidemia Mother      Diabetes Father      Endometrial Cancer Sister          Current Outpatient Prescriptions   Medication Sig Dispense Refill     benzoyl peroxide 5 % topical gel Apply topically 2 times daily 90 g 1     clindamycin (CLINDAGEL) 1 % topical gel Apply topically daily 60 g 11     levonorgestrel (MIRENA, 52 MG,) 20 MCG/24HR IUD 1 each by Intrauterine route once       metroNIDAZOLE (METROGEL) 0.75 % vaginal gel Place 1 applicator (5 g) vaginally At Bedtime for 5 days 70 g 0     Allergies   Allergen Reactions     Hydrocodone Itching     No lab  "results found.     Mammogram not appropriate for this patient based on age.    Pertinent mammograms are reviewed under the imaging tab.  History of abnormal Pap smear: NO - age 21-29 PAP every 3 years recommended     Reviewed and updated as needed this visit by clinical staff  Allergies  Meds  Problems         Reviewed and updated as needed this visit by Provider  Allergies  Meds  Problems        No past medical history on file.   No past surgical history on file.  Obstetric History     No data available          Review of Systems   Constitutional: Negative for chills and fever.   HENT: Negative for congestion, ear pain, hearing loss and sore throat.    Eyes: Negative for pain and visual disturbance.   Respiratory: Negative for cough and shortness of breath.    Cardiovascular: Negative for chest pain, palpitations and peripheral edema.   Gastrointestinal: Negative for abdominal pain, constipation, diarrhea, heartburn, hematochezia and nausea.   Breasts:  Negative for tenderness, breast mass and discharge.   Genitourinary: Positive for pelvic pain. Negative for dysuria, frequency, genital sores, hematuria, urgency, vaginal bleeding and vaginal discharge.   Musculoskeletal: Negative for arthralgias, joint swelling and myalgias.   Skin: Negative for rash.   Neurological: Negative for dizziness, weakness, headaches and paresthesias.   Psychiatric/Behavioral: The patient is not nervous/anxious.      This document serves as a record of the services and decisions personally performed and made by Shiva Burrell D.O. It was created on her behalf by Jann Montenegro, a trained medical scribe. The creation of this document is based on the provider's statements to the medical scribe.  Jann Montenegro November 26, 2018      OBJECTIVE:   /78  Pulse 74  Temp 98  F (36.7  C) (Oral)  Ht 5' 3\" (1.6 m)  Wt 124 lb (56.2 kg)  BMI 21.97 kg/m2  Physical Exam  GENERAL: healthy, alert and no distress  EYES: Eyes grossly normal " to inspection, PERRL and conjunctivae and sclerae normal  HENT: ear canals and TM's normal, nose and mouth without ulcers or lesions  NECK: no adenopathy, no asymmetry, masses, or scars and thyroid normal to palpation  RESP: lungs clear to auscultation - no rales, rhonchi or wheezes  BREAST: normal without masses, tenderness or nipple discharge and no palpable axillary masses or adenopathy  CV: regular rate and rhythm, normal S1 S2, no S3 or S4, no murmur, click or rub, no peripheral edema and peripheral pulses strong  ABDOMEN: soft, nontender, no hepatosplenomegaly, no masses and bowel sounds normal   (female): bimanual exam- normal female external genitalia, normal urethral meatus, vaginal mucosa pink, moist, well rugated, and normal cervix/adnexa/uterus without masses or discharge, IUD string seen  MS: no gross musculoskeletal defects noted, no edema  SKIN: no suspicious lesions or rashes  NEURO: Normal strength and tone, mentation intact and speech normal  PSYCH: mentation appears normal, affect normal/bright    Diagnostic Test Results:  none     ASSESSMENT/PLAN:       ICD-10-CM    1. Encounter for routine adult health examination with abnormal findings Z00.01 Hemoglobin   2. Elevated blood pressure reading without diagnosis of hypertension R03.0 Basic metabolic panel     OFFICE/OUTPT VISIT,EST,LEVL IV   3. Depression with anxiety F41.8 MENTAL HEALTH REFERRAL  - Adult; Assessments and Testing; General Psychological Testing; Other: [facility] [phone]; We will contact you to schedule the appointment or please call with any questions   4. Screening for malignant neoplasm of cervix Z12.4 Pap imaged thin layer screen reflex to HPV if ASCUS - recommend age 25 - 29     OFFICE/OUTPT VISIT,EST,LEVL IV   5. Screening for HIV (human immunodeficiency virus) Z11.4    6. Family history of high cholesterol Z83.42 Lipid panel reflex to direct LDL Fasting   7. Screening for thyroid disorder Z13.29  TSH with free T4 reflex   8. Pelvic pain in female R10.2 Wet prep     metroNIDAZOLE (METROGEL) 0.75 % vaginal gel     OFFICE/OUTPT VISIT,EST,LEVL IV   9. Acne vulgaris L70.0 clindamycin (CLINDAGEL) 1 % topical gel     benzoyl peroxide 5 % topical gel     OFFICE/OUTPT VISIT,EST,LEVL IV   10. Screen for STD (sexually transmitted disease) Z11.3 HIV Antigen Antibody Combo     Treponema Abs w Reflex to RPR and Titer     Chlamydia trachomatis PCR     Neisseria gonorrhoeae PCR   11. Lipid screening Z13.220    12. Impaired social interaction Z73.4 MENTAL HEALTH REFERRAL  - Adult; Assessments and Testing; General Psychological Testing; Other: [facility] [phone]; We will contact you to schedule the appointment or please call with any questions     OFFICE/OUTPT VISIT,EST,LEVL IV   13. BV (bacterial vaginosis) N76.0 metroNIDAZOLE (METROGEL) 0.75 % vaginal gel    B96.89 OFFICE/OUTPT VISIT,EST,LEVL IV   14. IUD (intrauterine device) in place Z97.5      Health Maintenance reviewed with patient.  PAP today.   She is not fasting today and will return to clinic for fasting labs. No STD concerns.   HIV CDC guidelines discussed with patient. Patient would like HIV screening with future lab orders. .     Has Mirena, got it last year. Endorses intermittent right ovary pain that is noted to be rhythmic, and improved since MIRENA. Reports that she has experienced severe acne flare in the first part of the year. She has uses peroxide base face wash and salicylic acid with toleration.  Tried Differin and Retin-A in the past, however she stated that she has sensitive skin. Patient voices that she picks at her face. She will use clindagel as directed. Follow up in 8 weeks to recheck    Reports that at last physical she had high blood pressure, rechecked mid year and was still high. Mom has high blood pressure and high cholesterol. She reports that her mother and maternal grandmother also have elevated blood  "pressures. -start inderal for both elevated bp and anxiety.  Follow up in one month    She endorses history of anxiety and depression   Depression is okay. Anxiety is off and on. In the past she stated that she was told she was socially awkward. She stated that she does well in school, but noted that she has trouble focusing at times. She endorses that she is socially and emotionally awkward in school and around others.    In the past she stated that she was recommended to follow up with an ASD assessment, but did not do this. Patient would like another referral today.   Patient will follow up with assessment as planned, referral made.     Pelvic cramping -has an iud-reports its the time of ovulation, resolving, check std, BV -treat today. Close monitoring, consider US if persisting    COUNSELING:  Reviewed preventive health counseling, as reflected in patient instructions       Regular exercise       Healthy diet/nutrition       Immunizations               Contraception       HIV screeninx in teen years, 1x in adult years, and at intervals if high risk    BP Readings from Last 1 Encounters:   18 130/78     Estimated body mass index is 21.97 kg/(m^2) as calculated from the following:    Height as of this encounter: 5' 3\" (1.6 m).    Weight as of this encounter: 124 lb (56.2 kg).    BP Screening:   Last 3 BP Readings:    BP Readings from Last 3 Encounters:   18 130/78   18 133/89   18 126/82       The following was recommended to the patient:  Re-screen BP within a year and recommended lifestyle modifications       reports that she has quit smoking. She has never used smokeless tobacco.      Counseling Resources:  ATP IV Guidelines  Pooled Cohorts Equation Calculator  Breast Cancer Risk Calculator  FRAX Risk Assessment  ICSI Preventive Guidelines  Dietary Guidelines for Americans, 2010  USDA's MyPlate  ASA Prophylaxis  Lung CA Screening    The information in this document, created by the " medical scribe for me, accurately reflects the services I personally performed and the decisions made by me. I have reviewed and approved this document for accuracy prior to leaving the patient care area.  November 26, 2018 12:54 PM      Shiva Burrell DO  [facility]  Answers for HPI/ROS submitted by the patient on 11/26/2018   PHQ-2 Score: 1

## 2018-11-27 LAB
C TRACH DNA SPEC QL NAA+PROBE: NEGATIVE
N GONORRHOEA DNA SPEC QL NAA+PROBE: NEGATIVE
SPECIMEN SOURCE: NORMAL
SPECIMEN SOURCE: NORMAL

## 2018-11-27 NOTE — TELEPHONE ENCOUNTER
Called [facility], he said there was a glitch in their software and they now have it figured out and it was accepted.     Eric Ferreira RN

## 2018-11-28 LAB
COPATH REPORT: NORMAL
PAP: NORMAL

## 2018-12-04 DIAGNOSIS — Z11.3 SCREEN FOR STD (SEXUALLY TRANSMITTED DISEASE): ICD-10-CM

## 2018-12-04 DIAGNOSIS — Z00.01 ENCOUNTER FOR ROUTINE ADULT HEALTH EXAMINATION WITH ABNORMAL FINDINGS: ICD-10-CM

## 2018-12-04 DIAGNOSIS — Z13.29 SCREENING FOR THYROID DISORDER: ICD-10-CM

## 2018-12-04 DIAGNOSIS — Z83.42 FAMILY HISTORY OF HIGH CHOLESTEROL: ICD-10-CM

## 2018-12-04 DIAGNOSIS — R03.0 ELEVATED BLOOD PRESSURE READING WITHOUT DIAGNOSIS OF HYPERTENSION: ICD-10-CM

## 2018-12-04 LAB
ANION GAP SERPL CALCULATED.3IONS-SCNC: 8 MMOL/L (ref 3–14)
BUN SERPL-MCNC: 13 MG/DL (ref 7–30)
CALCIUM SERPL-MCNC: 9 MG/DL (ref 8.5–10.1)
CHLORIDE SERPL-SCNC: 106 MMOL/L (ref 94–109)
CHOLEST SERPL-MCNC: 186 MG/DL
CO2 SERPL-SCNC: 26 MMOL/L (ref 20–32)
CREAT SERPL-MCNC: 0.78 MG/DL (ref 0.52–1.04)
GFR SERPL CREATININE-BSD FRML MDRD: 90 ML/MIN/1.7M2
GLUCOSE SERPL-MCNC: 95 MG/DL (ref 70–99)
HDLC SERPL-MCNC: 86 MG/DL
HGB BLD-MCNC: 14.7 G/DL (ref 11.7–15.7)
HIV 1+2 AB+HIV1 P24 AG SERPL QL IA: NONREACTIVE
LDLC SERPL CALC-MCNC: 85 MG/DL
NONHDLC SERPL-MCNC: 100 MG/DL
POTASSIUM SERPL-SCNC: 4.3 MMOL/L (ref 3.4–5.3)
SODIUM SERPL-SCNC: 140 MMOL/L (ref 133–144)
TRIGL SERPL-MCNC: 77 MG/DL
TSH SERPL DL<=0.005 MIU/L-ACNC: 2.13 MU/L (ref 0.4–4)

## 2018-12-04 PROCEDURE — 80048 BASIC METABOLIC PNL TOTAL CA: CPT | Performed by: FAMILY MEDICINE

## 2018-12-04 PROCEDURE — 36415 COLL VENOUS BLD VENIPUNCTURE: CPT | Performed by: FAMILY MEDICINE

## 2018-12-04 PROCEDURE — 85018 HEMOGLOBIN: CPT | Performed by: FAMILY MEDICINE

## 2018-12-04 PROCEDURE — 86780 TREPONEMA PALLIDUM: CPT | Performed by: FAMILY MEDICINE

## 2018-12-04 PROCEDURE — 87389 HIV-1 AG W/HIV-1&-2 AB AG IA: CPT | Performed by: FAMILY MEDICINE

## 2018-12-04 PROCEDURE — 80061 LIPID PANEL: CPT | Performed by: FAMILY MEDICINE

## 2018-12-04 PROCEDURE — 84443 ASSAY THYROID STIM HORMONE: CPT | Performed by: FAMILY MEDICINE

## 2018-12-04 NOTE — LETTER
[facility]  [address] [place] [id number] [phone]                                                                                                December 10, 2018    Yady Ruanoshannan  [address]  [facility] [address]        Dear Ms. Rodriguez,    The results of your recent lab tests were within normal limits. Enclosed is a copy of these results.  If you have any further questions or problems, please contact our office.      Results for orders placed or performed in visit on 12/04/18   Lipid panel reflex to direct LDL Fasting   Result Value Ref Range    Cholesterol 186 <200 mg/dL    Triglycerides 77 <150 mg/dL    HDL Cholesterol 86 >49 mg/dL    LDL Cholesterol Calculated 85 <100 mg/dL    Non HDL Cholesterol 100 <130 mg/dL   Basic metabolic panel   Result Value Ref Range    Sodium 140 133 - 144 mmol/L    Potassium 4.3 3.4 - 5.3 mmol/L    Chloride 106 94 - 109 mmol/L    Carbon Dioxide 26 20 - 32 mmol/L    Anion Gap 8 3 - 14 mmol/L    Glucose 95 70 - 99 mg/dL    Urea Nitrogen 13 7 - 30 mg/dL    Creatinine 0.78 0.52 - 1.04 mg/dL    GFR Estimate 90 >60 mL/min/1.7m2    GFR Estimate If Black >90 >60 mL/min/1.7m2    Calcium 9.0 8.5 - 10.1 mg/dL   TSH with free T4 reflex   Result Value Ref Range    TSH 2.13 0.40 - 4.00 mU/L   Hemoglobin   Result Value Ref Range    Hemoglobin 14.7 11.7 - 15.7 g/dL   HIV Antigen Antibody Combo   Result Value Ref Range    HIV Antigen Antibody Combo Nonreactive NR^Nonreactive       Treponema Abs w Reflex to RPR and Titer   Result Value Ref Range    Treponema Antibodies Nonreactive NR^Nonreactive         Sincerely,      Shiva Burrell, DO/mv

## 2018-12-05 LAB — T PALLIDUM AB SER QL: NONREACTIVE

## 2019-07-24 ENCOUNTER — ALLIED HEALTH/NURSE VISIT (OUTPATIENT)
Dept: FAMILY MEDICINE | Facility: CLINIC | Age: 24
End: 2019-07-24
Payer: COMMERCIAL

## 2019-07-24 VITALS — DIASTOLIC BLOOD PRESSURE: 84 MMHG | HEART RATE: 73 BPM | SYSTOLIC BLOOD PRESSURE: 125 MMHG

## 2019-07-24 DIAGNOSIS — Z01.30 BP CHECK: Primary | ICD-10-CM

## 2019-07-24 PROCEDURE — 99207 ZZC NO CHARGE NURSE ONLY: CPT | Performed by: FAMILY MEDICINE

## 2019-07-24 NOTE — PROGRESS NOTES
Yady Rodriguez was evaluated at [facility] on July 24, 2019 at which time her blood pressure was:    BP Readings from Last 3 Encounters:   07/24/19 125/84   11/26/18 130/78   09/27/18 133/89     Pulse Readings from Last 3 Encounters:   07/24/19 73   11/26/18 74   09/27/18 84       Reviewed lifestyle modifications for blood pressure control and reduction: including making healthy food choices, managing weight, getting regular exercise, smoking cessation, reducing alcohol consumption, monitoring blood pressure regularly.     Symptoms: None    BP Goal:< 140/90 mmHg    BP Assessment:  BP at goal    Potential Reasons for BP too high: NA - Not applicable    BP Follow-Up Plan: Recheck BP in 6 months at pharmacy    Recommendation to Provider: none    Note completed by: Meet Yeobah  [facility]   Ext #[phone], [phone]  #2

## 2019-07-24 NOTE — Clinical Note
[facility] at  [facility] Ext #[phone], [phone]  #2

## 2019-09-04 ENCOUNTER — OFFICE VISIT (OUTPATIENT)
Dept: FAMILY MEDICINE | Facility: CLINIC | Age: 24
End: 2019-09-04
Payer: COMMERCIAL

## 2019-09-04 VITALS
WEIGHT: 122 LBS | DIASTOLIC BLOOD PRESSURE: 87 MMHG | HEART RATE: 79 BPM | TEMPERATURE: 97.8 F | BODY MASS INDEX: 20.83 KG/M2 | SYSTOLIC BLOOD PRESSURE: 125 MMHG | HEIGHT: 64 IN

## 2019-09-04 DIAGNOSIS — L70.0 ACNE VULGARIS: ICD-10-CM

## 2019-09-04 DIAGNOSIS — F41.8 DEPRESSION WITH ANXIETY: ICD-10-CM

## 2019-09-04 DIAGNOSIS — Z00.00 ROUTINE GENERAL MEDICAL EXAMINATION AT A HEALTH CARE FACILITY: Primary | ICD-10-CM

## 2019-09-04 DIAGNOSIS — N89.8 VAGINAL DISCHARGE: ICD-10-CM

## 2019-09-04 DIAGNOSIS — B96.89 BV (BACTERIAL VAGINOSIS): ICD-10-CM

## 2019-09-04 DIAGNOSIS — Z97.5 IUD (INTRAUTERINE DEVICE) IN PLACE: ICD-10-CM

## 2019-09-04 DIAGNOSIS — R41.840 CONCENTRATION DEFICIT: ICD-10-CM

## 2019-09-04 DIAGNOSIS — N76.0 BV (BACTERIAL VAGINOSIS): ICD-10-CM

## 2019-09-04 LAB
SPECIMEN SOURCE: ABNORMAL
WET PREP SPEC: ABNORMAL

## 2019-09-04 PROCEDURE — 99395 PREV VISIT EST AGE 18-39: CPT | Performed by: FAMILY MEDICINE

## 2019-09-04 PROCEDURE — 87491 CHLMYD TRACH DNA AMP PROBE: CPT | Performed by: FAMILY MEDICINE

## 2019-09-04 PROCEDURE — 87210 SMEAR WET MOUNT SALINE/INK: CPT | Performed by: FAMILY MEDICINE

## 2019-09-04 PROCEDURE — 87591 N.GONORRHOEAE DNA AMP PROB: CPT | Performed by: FAMILY MEDICINE

## 2019-09-04 PROCEDURE — 99213 OFFICE O/P EST LOW 20 MIN: CPT | Mod: 25 | Performed by: FAMILY MEDICINE

## 2019-09-04 RX ORDER — CLINDAMYCIN HCL 300 MG
300 CAPSULE ORAL 2 TIMES DAILY
Qty: 14 CAPSULE | Refills: 0 | Status: SHIPPED | OUTPATIENT
Start: 2019-09-04 | End: 2019-09-11

## 2019-09-04 RX ORDER — METRONIDAZOLE 7.5 MG/G
1 GEL VAGINAL DAILY
Qty: 70 G | Refills: 0 | Status: SHIPPED | OUTPATIENT
Start: 2019-09-04 | End: 2021-03-31

## 2019-09-04 RX ORDER — CLINDAMYCIN PHOSPHATE 11.9 MG/ML
SOLUTION TOPICAL 2 TIMES DAILY
Qty: 60 ML | Refills: 3 | Status: SHIPPED | OUTPATIENT
Start: 2019-09-04 | End: 2024-02-19

## 2019-09-04 RX ORDER — MINOCYCLINE HYDROCHLORIDE 50 MG/1
50 CAPSULE ORAL 2 TIMES DAILY
Qty: 120 CAPSULE | Refills: 0 | Status: SHIPPED | OUTPATIENT
Start: 2019-09-04 | End: 2019-11-03

## 2019-09-04 ASSESSMENT — ENCOUNTER SYMPTOMS
NERVOUS/ANXIOUS: 1
HEMATOCHEZIA: 0
BREAST MASS: 0
NAUSEA: 0
PALPITATIONS: 0
DIARRHEA: 0
CONSTIPATION: 0
WEAKNESS: 0
HEMATURIA: 0
ABDOMINAL PAIN: 0
COUGH: 0
FEVER: 0
SHORTNESS OF BREATH: 0
DIZZINESS: 0
HEARTBURN: 0
DYSURIA: 0
CHILLS: 0
EYE PAIN: 0
HEADACHES: 0
SORE THROAT: 0
JOINT SWELLING: 0
FREQUENCY: 0
PARESTHESIAS: 0
MYALGIAS: 0
ARTHRALGIAS: 0

## 2019-09-04 ASSESSMENT — MIFFLIN-ST. JEOR: SCORE: 1283.39

## 2019-09-04 ASSESSMENT — ANXIETY QUESTIONNAIRES
1. FEELING NERVOUS, ANXIOUS, OR ON EDGE: MORE THAN HALF THE DAYS
GAD7 TOTAL SCORE: 14
2. NOT BEING ABLE TO STOP OR CONTROL WORRYING: MORE THAN HALF THE DAYS
5. BEING SO RESTLESS THAT IT IS HARD TO SIT STILL: MORE THAN HALF THE DAYS
7. FEELING AFRAID AS IF SOMETHING AWFUL MIGHT HAPPEN: MORE THAN HALF THE DAYS
3. WORRYING TOO MUCH ABOUT DIFFERENT THINGS: MORE THAN HALF THE DAYS
6. BECOMING EASILY ANNOYED OR IRRITABLE: MORE THAN HALF THE DAYS
IF YOU CHECKED OFF ANY PROBLEMS ON THIS QUESTIONNAIRE, HOW DIFFICULT HAVE THESE PROBLEMS MADE IT FOR YOU TO DO YOUR WORK, TAKE CARE OF THINGS AT HOME, OR GET ALONG WITH OTHER PEOPLE: VERY DIFFICULT

## 2019-09-04 ASSESSMENT — PATIENT HEALTH QUESTIONNAIRE - PHQ9
SUM OF ALL RESPONSES TO PHQ QUESTIONS 1-9: 8
5. POOR APPETITE OR OVEREATING: MORE THAN HALF THE DAYS

## 2019-09-04 NOTE — PATIENT INSTRUCTIONS
I has placed a referral for you to get a formalized psychological test done so we can get a an accurate diagnosis and treatment plan. Please try to do this before your insurance expires.     Try the antibiotic prescription for the acne condition. I would also recommend that you use sunscreen and avoid picking.     Use clinda for BV first for 7 days and hope it will take care of your vaginal symptoms  I did send metro gel vaginal cream if coming back in the next year    Wait to start the oral antibiotics for acne until you are done with clinda  If face looks better just use topical antibiotics  Patient Education         Preventive Health Recommendations  Female Ages 21 to 25     Yearly exam:     See your health care provider every year in order to  o Review health changes.   o Discuss preventive care.    o Review your medicines if your doctor has prescribed any.      You should be tested each year for STDs (sexually transmitted diseases).       Talk to your provider about how often you should have cholesterol testing.      Get a Pap test every three years. If you have an abnormal result, your doctor may have you test more often.      If you are at risk for diabetes, you should have a diabetes test (fasting glucose).     Shots:     Get a flu shot each year.     Get a tetanus shot every 10 years.     Consider getting the shot (vaccine) that prevents cervical cancer (Gardasil).    Nutrition:     Eat at least 5 servings of fruits and vegetables each day.    Eat whole-grain bread, whole-wheat pasta and brown rice instead of white grains and rice.    Get adequate Calcium and Vitamin D.     Lifestyle    Exercise at least 150 minutes a week each week (30 minutes a day, 5 days a week). This will help you control your weight and prevent disease.    Limit alcohol to one drink per day.    No smoking.     Wear sunscreen to prevent skin cancer.    See your dentist every six months for an exam and cleaning.    [facility]  Clinic   Discharged by : Indira Jalloh MA    Paper scripts provided to patient : no     If you have any questions regarding your visit please contact your care team:     Team Gold                Clinic Hours Telephone Number     Dr. Winifred Viveros, CNP 7am-7pm  Monday - Thursday   7am-5pm  Fridays  [phone]   (Appointment scheduling available 24/7)     RN Line  [phone] option 2     Urgent Care - [place] - 11am-9pm Monday-Friday Saturday-Sunday- 9am-5pm     [place] -   5pm-9pm Monday-Friday Saturday-Sunday- 9am-5pm    [phone] - [place]    [phone] - [place]     For a Price Quote for your services, please call our [employer] Price Line at [phone].     What options do I have for visits at the clinic other than the traditional office visit?     To expand how we care for you, many of our providers are utilizing electronic visits (e-visits) and telephone visits, when medically appropriate, for interactions with their patients rather than a visit in the clinic. We also offer nurse visits for many medical concerns. Just like any other service, we will bill your insurance company for this type of visit based on time spent on the phone with your provider. Not all insurance companies cover these visits. Please check with your medical insurance if this type of visit is covered. You will be responsible for any charges that are not paid by your insurance.   E-visits via [employer]: generally incur a $45.00 fee.     Telephone visits:  Time spent on the phone: *charged based on time that is spent on the phone in increments of 10 minutes. Estimated cost:   5-10 mins $30.00   11-20 mins. $59.00   21-30 mins. $85.00     Use [employer] (secure email communication and access to your chart) to send your primary care provider a message or make an appointment. Ask someone on your Team how to sign up for [employer]an[employer].     As always, Thank you for  trusting us with your health care needs!    [facility]:    Scheduling Appointments  Brandin Best [facility]  Call: [phone]    Jax Madrigal [facility]  Call: [phone]    [facility]  Call: [phone]    For Gastroenterology referrals   [facility]   Clinics and Surgery Center, 4th Floor   [address] [place] [address]   Appointments: [phone]    WHERE TO GO FOR CARE?  Clinic    Make an appointment if you:       Are sick (cold, cough, flu, sore throat, earache or in pain).       Have a small injury (sprain, small cut, burn or broken bone).       Need a physical exam, Pap smear, vaccine or prescription refill.       Have questions about your health or medicines.    To reach us:      Call [phone] ([phone]). Open 24 hours every day. (For counseling services, call [phone].)    Log into [employer]. (Visit [email or web address] to create an account.) Hospital emergency room    An emergency is a serious or life- threatening problem that must be treated right away.    Call 049 or get to the hospital if you have:      Very bad or sudden:            - Chest pain or pressure         - Bleeding         - Head or belly pain         - Dizziness or trouble seeing, walking or                          Speaking      Problems breathing      Blood in your vomit or you are coughing up blood      A major injury (knocked out, loss of a finger or limb, rape, broken bone protruding from skin)    A mental health crisis. (Or call the Mental Health Crisis line at 1-911.324.6933 or Suicide Prevention Hotline at 1-232.291.5178.)    Open 24 hours every day. You don't need an appointment.     Urgent care    Visit urgent care for sickness or small injuries when the clinic is closed. You don't need an appointment. To check hours or find an urgent care near you, visit [email or web address]. Online care    Get  [facility] for more than 70 common problems, like colds, allergies and infections. Open 24 hours every day at:   [email or web address]   Need help deciding?    For advice about where to be seen, you may call your clinic and ask to speak with a nurse. We're here for you 24 hours every day.         If you are deaf or hard of hearing, please let us know. We provide many free services including sign language interpreters, oral interpreters, TTYs, telephone amplifiers, note takers and written materials.
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
## 2019-09-04 NOTE — LETTER
[address] [id number] [phone]                                                                                                September 9, 2019    Yady Rodriguez  [address]  [facility] [id number]        Dear Ms. Rodriguez,    Your recent lab results for STD's were negative. Here is a copy of your results from the visit.       Sincerely,      Shiva Burrell, DO/hl    Results for orders placed or performed in visit on 09/04/19   Wet prep   Result Value Ref Range    Specimen Description Vagina     Wet Prep Many  Clue cells seen   (A)     Wet Prep Moderate  WBC'S seen       Wet Prep No Trichomonas seen     Wet Prep No yeast seen    Chlamydia trachomatis PCR   Result Value Ref Range    Specimen Description Cervix     Chlamydia Trachomatis PCR Negative NEG^Negative   Neisseria gonorrhoeae PCR   Result Value Ref Range    Specimen Descrip Cervix     N Gonorrhea PCR Negative NEG^Negative

## 2019-09-05 ASSESSMENT — ANXIETY QUESTIONNAIRES: GAD7 TOTAL SCORE: 14

## 2019-09-23 ENCOUNTER — MYC MEDICAL ADVICE (OUTPATIENT)
Dept: FAMILY MEDICINE | Facility: CLINIC | Age: 24
End: 2019-09-23

## 2019-09-24 NOTE — TELEPHONE ENCOUNTER
MyChart sent relaying provider recommendations. Will leave encounter open until read in case of response, then may close.    Deysi Reese RN

## 2019-09-24 NOTE — TELEPHONE ENCOUNTER
Patient has read [employer] message without further response, so will close encounter at this time.    Deysi Reese RN

## 2019-09-24 NOTE — TELEPHONE ENCOUNTER
Ok to use metrogel, but if coming back needs to be seen again, with gyn or fp provider  Thanks  Shiva Burrell D.O.

## 2019-09-24 NOTE — TELEPHONE ENCOUNTER
"Route MyChart to PCP for review. Patient's BV symptoms are reoccurring (treated 9/4/19) and advised to use metro gel if coming back in the next year - your note copied below.  Okay to use metro gel now or is it too soon?    Lauri Edwards RN    U\"se clinda for BV first for 7 days and hope it will take care of your vaginal symptoms  I did send metro gel vaginal cream if coming back in the next year\"  "

## 2020-03-04 ENCOUNTER — E-VISIT (OUTPATIENT)
Dept: FAMILY MEDICINE | Facility: CLINIC | Age: 25
End: 2020-03-04
Payer: COMMERCIAL

## 2020-03-04 DIAGNOSIS — B37.31 CANDIDAL VULVOVAGINITIS: Primary | ICD-10-CM

## 2020-03-04 PROCEDURE — 99421 OL DIG E/M SVC 5-10 MIN: CPT | Performed by: FAMILY MEDICINE

## 2020-03-04 RX ORDER — FLUCONAZOLE 150 MG/1
150 TABLET ORAL ONCE
Qty: 1 TABLET | Refills: 1 | Status: SHIPPED | OUTPATIENT
Start: 2020-03-04 | End: 2020-03-04

## 2020-03-04 NOTE — PATIENT INSTRUCTIONS
Yeast Infection (Candida Vaginal Infection)    You have a Candida vaginal infection. This is also known as a yeast infection. It is most often caused by a type of yeast (fungus) called Candida. Candida are normally found in the vagina. But if they increase in number, this can lead to infection and cause symptoms.  Symptoms of a yeast infection can include:    Clumpy or thin, white discharge, which may look like cottage cheese    Itching or burning    Burning with urination  Certain factors can make a yeast infection more likely. These can include:    Taking certain medicines, such as antibiotics or birth control pills    Pregnancy    Diabetes    Weak immune system  A yeast infection is most often treated with antifungal medicine. This may be given as a vaginal cream or pills you take by mouth. Treatment may last for about 1 to 7 days. Women with severe or recurrent infections may need longer courses of treatment.  Home care    If you re prescribed medicine, be sure to use it as directed. Finish all of the medicine, even if your symptoms go away. Note: Don t try to treat yourself using over-the-counter products without talking to your provider first. He or she will let you know if this is a good option for you.    Ask your provider what steps you can take to help reduce your risk of having a yeast infection in the future.  Follow-up care  Follow up with your healthcare provider, or as directed.  When to seek medical advice  Call your healthcare provider right away if:    You have a fever of 100.4 F (38 C) or higher, or as directed by your provider.    Your symptoms worsen, or they don t go away within a few days of starting treatment.    You have new pain in the lower belly or pelvic region.    You have side effects that bother you or a reaction to the cream or pills you re prescribed.    You or any partners you have sex with have new symptoms, such as a rash, joint pain, or sores.  Date Last Reviewed:  10/1/2017    4475-7083 [employer]. [address] [place] [address], [place] [address]. All rights reserved. This information is not intended as a substitute for professional medical care. Always follow your healthcare professional's instructions.          Preventing Vaginitis     Use mild, unscented soap when you bathe or shower to avoid irritating your vagina.    Vaginitis is irritation or infection of the vagina or vulva (the outside opening of the vagina). Vaginitis can be caused by bacteria, viruses, parasites, or yeast. Chemicals (such as in perfumes or soaps or in spermicides) can sometimes be a cause. Vaginitis can be caused by hormone changes in pregnancy or with menopause. You can help prevent vaginitis. Follow the tips below. And see your healthcare provider if you have any symptoms.  Hygiene    Avoid chemicals. Do not use vaginal sprays. Do not use scented toilet paper or tampons that are scented. Sprays and scents have chemicals that can irritate your vagina.    Do not douche unless you are told to by your healthcare provider. Douching is rarely needed. And it upsets the normal balance in the vagina.    Wash yourself well. Wash the outer vaginal area (vulva) every day with mild, unscented soap. Keep it as dry as possible.    Wipe correctly. Make sure to wipe from front to back after a bowel movement. This helps keep from spreading bacteria from your anus to your vagina.    Change your tampon often. During your period, make sure to change your tampon as often as directed on the package. This allows the normal flow of vaginal discharge and blood.  Lifestyle    Limit your number of sexual partners. The more partners you have, the greater your risk of infection. Using condoms helps reduce your risk.    Get enough sleep. Sleep helps keep your body s immune system healthy. This helps you fight infection.    Lose weight, if needed. Excess weight can reduce air circulation around your vagina. This can  increase your risk of infection.    Exercise regularly. Regular activity helps keep your body healthy.    Take antibiotics only as directed. Antibiotics can change the normal chemical balance in the vagina.    Clothing    Don t sit in wet clothes. Yeast thrives when it s warm and damp.    Don t wear tight pants. And don t wear tights, leggings, or hose without a cotton crotch. These types of clothing trap warmth and moisture.    Wear cotton underwear. Cotton lets air circulate around the vagina.  Symptoms of vaginitis    Irritation, swelling, or itching of the genital area    Vaginal discharge    Bad vaginal odor    Pain or burning during urination   Date Last Reviewed: 12/1/2016 2000-2019 [employer]. [address] [place] [address], [place] [address]. All rights reserved. This information is not intended as a substitute for professional medical care. Always follow your healthcare professional's instructions.

## 2020-03-11 ENCOUNTER — HEALTH MAINTENANCE LETTER (OUTPATIENT)
Age: 25
End: 2020-03-11

## 2020-08-03 ENCOUNTER — MYC MEDICAL ADVICE (OUTPATIENT)
Dept: FAMILY MEDICINE | Facility: CLINIC | Age: 25
End: 2020-08-03

## 2020-08-03 DIAGNOSIS — F41.8 DEPRESSION WITH ANXIETY: Primary | ICD-10-CM

## 2020-08-03 DIAGNOSIS — F31.81 BIPOLAR 2 DISORDER (H): ICD-10-CM

## 2020-08-04 NOTE — TELEPHONE ENCOUNTER
Please see MyChart message.     See OV note from 9/2019: I has placed a referral for you to get a formalized psychological test done so we can get a an accurate diagnosis and treatment plan. Please try to do this before your insurance expires.     Patient attached psych eval report in message attachment    Routed to PCP to review and advise.     Farrah James, RN, BSN, PHN  [facility]

## 2020-08-04 NOTE — TELEPHONE ENCOUNTER
I would prefer to start her with psych then after med choice  Is med, I can take  Over after that  Referral is done  Shiva Burrell D.O.

## 2020-12-27 ENCOUNTER — HEALTH MAINTENANCE LETTER (OUTPATIENT)
Age: 25
End: 2020-12-27

## 2021-01-07 ENCOUNTER — TELEPHONE (OUTPATIENT)
Dept: FAMILY MEDICINE | Facility: CLINIC | Age: 26
End: 2021-01-07

## 2021-01-07 NOTE — TELEPHONE ENCOUNTER
Patient Quality Outreach      Summary:    Patient has the following on her problem list/HM: None    Patient is due/failing the following:   Adult/Adolescent physical, date due: 9/4/2020    Type of outreach:    Sent [employer] message.    Questions for provider review:    None                                                                                                                                     Indira Jalloh MA       Chart routed to Care Team.

## 2021-01-25 NOTE — TELEPHONE ENCOUNTER
Patient Quality Outreach 2nd Attempt      Summary:    Type of outreach:    Phone, left message for patient/parent to call back.   MYCHART MESSAGE READ    Next Steps:  Reach out within 90 days via none, MYCHART message read .    Max number of attempts reached: Yes. Will try again in 90 days if patient still on fail list.    Questions for provider review:    None                                                                                                                    Indira Jalloh MA       Chart routed to Care Team.

## 2021-03-24 NOTE — PROGRESS NOTES
Assessment & Plan     Encounter to establish care    Vaginitis and vulvovaginitis  Chronic vulvovaginal discomfort with external irritation and some discharge on exam. Wet prep not consistent with infection - pH >4.5 more suggestive of BV but exam more suggestive of yeast. mychart sent - will return for culture and gram stain to help direct treatment, consider topical steroid vs topical estrogen if no infection identified.   - Wet Prep ([facility])    WILNER (generalized anxiety disorder)  Autism  Patient reports substantial long-standing anxiety, affecting their function more and more. Previous Psych evalaution suggested possible bipolar 2 but patient is less convinced of this, however would like to be reevaluated and get medication recommendations as is hesitant to pursue selective serotonin reuptake inhibitor monotherapy if underlying bipolar 2. Requested deferring psychotropic meds until after that consult. Had previously been suggested propranolol for more autonomic anxiety sx - reviewed this vs clonidine and pt chose propranolol. Counseled on side effects.   - propranolol ER (INDERAL LA) 80 MG 24 hr capsule  Dispense: 30 capsule; Refill: 3  - BEHAVIORAL HEALTH REFERRAL ([facility] interal and external)  - could recheck TSH - normal in 2018       [facility]   [facility]  Review of the result(s) of each unique test - wet prep  Prescription drug management  34 minutes spent on the date of the encounter doing chart review, history and exam, documentation and further activities per the note      Return in about 4 weeks (around 4/22/2021) for Follow up, with me for anxiety, gyn sx .    Soraya Sandhu DO  [facility] SUYAPAKELLIAMANDA Holbrook is a 25 year old who presents for the following health issues     HPI     Establish care - therapy and couples therapy, dentist, and PCP!     Anxiety Follow-Up  Diagnosed w/ WILNER and Autism at [facility]  "Psychotherapy - end of 2019. Also diagnosed w/ Bipolar 2 -- would lke a second opinion on that one.     Is just establishing w/ therapist again now       Has tried therapy a couple times before. Takes Yusef a long time to process if and why they want to do something. Now has clear goals for therapy.     Panic attacks - every once in a while feels like there's a \"big manifestation\" but mostly sx are just chronic.     Did go through a breakup recently which was tough.     Meds tried:   Lexapro in high school, didn't stick w/ them bc didn't get what they were going to do. Never felt like any omar w/ SSRIs.     Social History     Tobacco Use     Smoking status: Former Smoker     Packs/day: 0.00     Years: 0.00     Pack years: 0.00     Smokeless tobacco: Never Used   Substance Use Topics     Alcohol use: Yes     Comment: 2-3 drinks a week      Drug use: No     WILNER-7 SCORE 9/4/2019 3/25/2021   Total Score 14 16     PHQ 9/4/2019 3/25/2021   PHQ-9 Total Score 8 15   Q9: Thoughts of better off dead/self-harm past 2 weeks Not at all Not at all         Vaginal/vulvar discomfort  Doesn't feel like yeast in the past. Not like BV in the past. IUD is a couple of years old. Symptoms: inbetween labia gets irritated sometimes - red patches of dry irritation. Some buildup of oily, waxy, skin cells. Doesn't seem like discharge.   - wears boxer briefs, loose clothing all quarantine. Thought maybe it was sitting yudelka  Desk all day in tight jeans.   - unscented laundry detergent. Tried an anti-itch cream, caused more irritation. Tries to use water, avoids soaps. Even unscented baby wipes bother that skin.   - sensitive skin wise in general     - Mirena IUD - amenorrheic since placed Dec 2017.     Not having sex currently. Last partner over a year ago. Also a vagina owner - did have some fluctuations in body chemistry bc of that. No testing over this year. Never STDs. No pelvic pain.     In the past, did use a Nuva Ring and liked how it " "helped w/ lubrication. Does feel like deals w/ dryness now. Has tried water based lube but didn't do a lot.       SOCIAL HISTORY  Has anyone hurt you physically, for example by pushing, hitting, slapping or kicking you or forcing you to have sex? Denies  Do you feel threatened or controlled by a partner, ex-partner or anyone in your life? Denies    Relationship/household: lives with two roommates   Employment:  for [employer]    Alcohol: yes, 1-2 per week    Tobacco: no   Drug use: no     Physical activity: no     SEXUAL HEALTH HISTORY  Sexual concerns: No   Pregnancy History: G0  Menstrual history: amenorrheic w/ IUD   Last Pap Smear Date:   Lab Results   Component Value Date    PAP NIL 11/26/2018     Abnormal Pap History: None  STI History: Neg      Review of Systems   Weight gain during last year   IBS - knows triggers fairly well   Reynaud's, \"pinched nerve in shoulder makes fingers numb\"  \"Long-term anxiety, has always been present\"  Difficulty sleeping due to anxiety   Pertinent positives and negatives per HPI.          Objective    /84 (BP Location: Left arm, Patient Position: Sitting, Cuff Size: Adult Regular)   Pulse 89   Temp 98.1  F (36.7  C) (Oral)   Resp 16   Ht 1.633 m (5' 4.29\")   Wt 61 kg (134 lb 6.4 oz)   Breastfeeding No   BMI 22.86 kg/m    Body mass index is 22.86 kg/m .  Physical Exam   GENERAL: healthy, alert and no distress  RESP: lungs clear to auscultation - no rales, rhonchi or wheezes  CV: regular rate and rhythm, normal S1 S2, no S3 or S4, no murmur, click or rub, no peripheral edema   : vulvar erythema to labia minora and vestibule. No lesions, no external discharge or scaling. Vaginal mucosa pink and well rugated. Moderate whitish discharge in vault. Cervix visualized and normal in appearance, IUD strings in place.  MS: no gross musculoskeletal defects noted, no edema  NEURO: Normal strength and tone, mentation intact and speech normal  PSYCH: mentation " appears normal, affect normal     Results for orders placed or performed in visit on 03/25/21   Wet Prep ([facility])     Status: None   Result Value Ref Range    Yeast Wet Prep None none    Motile Trichomonas Wet Prep Negative Negative    Clue Cells Wet Prep None NONE    WBC WET PREP None 2 - 5    Bacteria Wet Prep Few None    pH Wet Prep >4.5 3.8 - 4.5    Odor Wet Prep None NONE

## 2021-03-25 ENCOUNTER — TELEPHONE (OUTPATIENT)
Dept: PSYCHOLOGY | Facility: CLINIC | Age: 26
End: 2021-03-25

## 2021-03-25 ENCOUNTER — OFFICE VISIT (OUTPATIENT)
Dept: FAMILY MEDICINE | Facility: CLINIC | Age: 26
End: 2021-03-25
Payer: COMMERCIAL

## 2021-03-25 VITALS
DIASTOLIC BLOOD PRESSURE: 84 MMHG | SYSTOLIC BLOOD PRESSURE: 139 MMHG | RESPIRATION RATE: 16 BRPM | HEART RATE: 89 BPM | TEMPERATURE: 98.1 F | WEIGHT: 134.4 LBS | HEIGHT: 64 IN | BODY MASS INDEX: 22.94 KG/M2

## 2021-03-25 DIAGNOSIS — F84.0 AUTISM: ICD-10-CM

## 2021-03-25 DIAGNOSIS — F41.1 GAD (GENERALIZED ANXIETY DISORDER): ICD-10-CM

## 2021-03-25 DIAGNOSIS — Z76.89 ENCOUNTER TO ESTABLISH CARE: Primary | ICD-10-CM

## 2021-03-25 DIAGNOSIS — N76.0 VAGINITIS AND VULVOVAGINITIS: ICD-10-CM

## 2021-03-25 LAB
BACTERIA: NORMAL
CLUE CELLS: NORMAL
MOTILE TRICHOMONAS: NEGATIVE
ODOR: NORMAL
PH WET PREP: >4.5 (ref 3.8–4.5)
WBC WET PREP: NORMAL (ref 2–5)
YEAST: NORMAL

## 2021-03-25 PROCEDURE — 87210 SMEAR WET MOUNT SALINE/INK: CPT | Performed by: STUDENT IN AN ORGANIZED HEALTH CARE EDUCATION/TRAINING PROGRAM

## 2021-03-25 PROCEDURE — 99214 OFFICE O/P EST MOD 30 MIN: CPT | Performed by: STUDENT IN AN ORGANIZED HEALTH CARE EDUCATION/TRAINING PROGRAM

## 2021-03-25 RX ORDER — PROPRANOLOL HYDROCHLORIDE 80 MG/1
80 CAPSULE, EXTENDED RELEASE ORAL DAILY
Qty: 30 CAPSULE | Refills: 3 | Status: SHIPPED | OUTPATIENT
Start: 2021-03-25 | End: 2021-07-30

## 2021-03-25 ASSESSMENT — ANXIETY QUESTIONNAIRES
IF YOU CHECKED OFF ANY PROBLEMS ON THIS QUESTIONNAIRE, HOW DIFFICULT HAVE THESE PROBLEMS MADE IT FOR YOU TO DO YOUR WORK, TAKE CARE OF THINGS AT HOME, OR GET ALONG WITH OTHER PEOPLE: VERY DIFFICULT
1. FEELING NERVOUS, ANXIOUS, OR ON EDGE: MORE THAN HALF THE DAYS
3. WORRYING TOO MUCH ABOUT DIFFERENT THINGS: NEARLY EVERY DAY
6. BECOMING EASILY ANNOYED OR IRRITABLE: NEARLY EVERY DAY
GAD7 TOTAL SCORE: 16
5. BEING SO RESTLESS THAT IT IS HARD TO SIT STILL: MORE THAN HALF THE DAYS
2. NOT BEING ABLE TO STOP OR CONTROL WORRYING: MORE THAN HALF THE DAYS
7. FEELING AFRAID AS IF SOMETHING AWFUL MIGHT HAPPEN: SEVERAL DAYS

## 2021-03-25 ASSESSMENT — MIFFLIN-ST. JEOR: SCORE: 1334.25

## 2021-03-25 ASSESSMENT — PATIENT HEALTH QUESTIONNAIRE - PHQ9
SUM OF ALL RESPONSES TO PHQ QUESTIONS 1-9: 15
5. POOR APPETITE OR OVEREATING: NEARLY EVERY DAY

## 2021-03-25 NOTE — TELEPHONE ENCOUNTER
Psychiatry Consult Referral:  Please schedule this patient with Dr. Payne.  Check with the patient if they are able to do a video visit.  They will need access to either a smartphone or a laptop with camera/microphone.  Please talk to patient about ability to do video visits.  These consults should really be done as a video visit if at all possible.  If patient does not have access to technology, we can offer them to come into clinic to be at one of our computers to do the video visit with the psychiatrist.      This is for a one time consult only, not for ongoing psychiatric care.  She will provide recommendations to the PCP for ongoing care.     Please let the patient know that this is an educational consult clinic.  For that reason, Dr. Payne and a resident will be seeing the patient together virtually.     If you are unable to reach the patient after two phone calls, please send a letter and close this encounter.    Thank you!

## 2021-03-26 ASSESSMENT — ANXIETY QUESTIONNAIRES: GAD7 TOTAL SCORE: 16

## 2021-03-31 PROBLEM — F41.1 GAD (GENERALIZED ANXIETY DISORDER): Status: ACTIVE | Noted: 2021-03-31

## 2021-03-31 PROBLEM — F84.0 AUTISM: Status: ACTIVE | Noted: 2021-03-31

## 2021-04-15 ENCOUNTER — VIRTUAL VISIT (OUTPATIENT)
Dept: PSYCHOLOGY | Facility: CLINIC | Age: 26
End: 2021-04-15
Payer: COMMERCIAL

## 2021-04-15 DIAGNOSIS — F41.9 ANXIETY: Primary | ICD-10-CM

## 2021-04-15 PROCEDURE — 90792 PSYCH DIAG EVAL W/MED SRVCS: CPT | Mod: GT | Performed by: PSYCHIATRY & NEUROLOGY
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
## 2021-04-15 NOTE — PATIENT INSTRUCTIONS
Plan Today  - start sertraline 50mg tab: take 1/2 tab (25mg) for at least 2 wks & until seen by PCP  - (this is Zoloft)  - take this in the morning unless it makes you tired, then move it to the evening  - please make an appt with Dr Sandhu in about 2 weeks    Thank you for coming to the [facility].    Scheduling:  If you have any concerns about today's visit or wish to schedule another appointment please call our office during normal business hours [phone] (8-5:00 M-F)        MENTAL HEALTH CRISIS NUMBERS:  For a medical emergency please call  911 or go to the nearest ER.     [facility]:   [facility] -[phone]   Crisis Residence [facility] -[phone]   Walk-In Counseling [facility] -[phone]   COPE 24/7 [place] Mobile Team -[phone] (adults)/[phone] (child)  CHILD: [facility] needs assessment team - [phone]      [facility]:   [facility] - [phone]   Walk-in counseling [facility] - [phone]   Walk-in counseling [facility] - [phone]   Crisis Residence [facility] - [phone]  Urgent Care Adult Mental Cwcoko-[phone] mobile unit/ 24/7 crisis line    National Crisis Numbers:   National Suicide Prevention Lifeline: 6-966-479-TALK (415-062-0217)  Poison Control Center - 1-483.324.5828  Accessbio.AskBot/resources for a list of additional resources (SOS)  Trans Lifeline a hotline for transgender people 1-789.244.8546  The Felipe Project a hotline for LGBT youth 1-905.243.3052  Crisis Text Line: For any crisis 24/7   To: 795344  see www.crisistextline.org  - IF MAKING A CALL FEELS TOO HARD, send a text!       Again thank you for choosing [facility] and please let us know how we can best partner with you to improve you and your family's health.

## 2021-05-19 NOTE — PROGRESS NOTES
Assessment & Plan     Vaginitis and vulvovaginitis  Primary sx of external irritation. Exam c/w candida but previous wet prep showed pH >4.5 and no yeast so less consistent. Will repeat today and pursue yeast culture. Offered empiric fluconazole without culture but pt has gone back and forth between BV and yeast in past and would prefer dx confirmation. If no infection identified, would trial external topical corticosteroid ointment.   - Yeast culture  - Wet Prep ([facility])    Cervical cancer screening  Preferred to update screening now to avoid another pelvic exam in 6 mos.   - Pap imaged thin layer screen reflex to HPV if ASCUS - recommended age 25 - 29 years    WILNER (generalized anxiety disorder)  Depression with anxiety  Improved on propranolol and low dose sertraline. Will stay at this dose sertraline for now - if nausea persists or more bothersome consider stop or switch to escitalopram.     Gender dysphoria  Nonbinary and interested in low dose testosterone for voice and subtle body changes. Has done lots of research, well versed on expected effects. Also interested in top surgery. Provided with T consent info to review, also provided w/ list of gender therapists mostly to establish in anticipation of eventual top surgery. We agree that waiting a month to a few to see how mood sx stabilize before introducing T makes sense.        Addendum:  Notified by lab that there was a miscommunication and proper swab for yeast culture was not obtained. Wet prep showed low pH of 3, no WBC, white clumps c/w yeast but no hyphae or buds seen on KOH prep. Called pt to discuss -- clinical exam and history very consistent with candida, and based on this plus wet prep I would be inclined to treat for persistent/severe candida and not have her return just for a culture. If sx persist despite PO fluconazole would obtain yeast culture at next visit. Script sent.       Review of the result(s) of each unique test - wet  "prep  Ordering of each unique test  42 minutes spent on the date of the encounter doing chart review, history and exam, documentation and further activities per the note        Return in about 3 months (around 8/20/2021) for Follow up, with me.    Soraya Sandhu DO  [facility] DARWIN Holbrook is a 25 year old who presents for the following health issues     Chief Complaint   Patient presents with     Medication Follow-up     been on sertraline for past month and has been experiencing brain fog, digestion issues like nausea, fatigue, might be related to difficulty taking meds on time. Also wants to discuss starting HRT, what process is like, where they could access HRT care      Gyn Exam     yeast culture swab        HPI       Depression and Anxiety Follow-Up  Visit w/ Elmer in April. No clear BP2 but still possible -- elected cautious titration of sertraline. Started propranolol w/ me in March.       How are you doing with your depression since your last visit? First two weeks were kind of rough. First day clenched teeth one night harder than they ever have (does clench their jaw when they sleep). That went away. Was very fatigued first two weeks. Now thinks maybe they're noticing some digestive weirdness. Nauseous and grumbly tummy, feels hungry but throat doesn't want to cooperate. Smoothie this morning went okay.     Pretty sure they have IBS - has a lot of diarrhea symptoms.     Struggling w/ meds at e same time - misses at night, takes the next morning, takes the next dose late at night. Takes w/ lots of water, sometimes with food.       How are you doing with your anxiety since your last visit?  Feels more clearheaded but also went through a breakup recently and thinks that's contribitng. Has felt different w/ propranolol - felt like anxiety sx are more manageable. Can still notice if they're gettings ome physical anxiety sx but easier to move through. Feels \"more present.\" " "        Have you had a significant life event? OTHER: recent end of relationship, moving [place] 1 to studio by themselves      Social History     Tobacco Use     Smoking status: Former Smoker     Packs/day: 0.00     Years: 0.00     Pack years: 0.00     Smokeless tobacco: Never Used   Substance Use Topics     Alcohol use: Yes     Comment: 2-3 drinks a week      Drug use: No     PHQ 9/4/2019 3/25/2021 5/20/2021   PHQ-9 Total Score 8 15 11   Q9: Thoughts of better off dead/self-harm past 2 weeks Not at all Not at all Not at all     WILNER-7 SCORE 9/4/2019 3/25/2021   Total Score 14 16     Today GAD7 11, PHQ9 7 (q 9 is 0)      Vulvovaginal sx  Irritation and itching. Waxes and wanes - some days yes, some days no. Sometimes so bad that they have to hop in the shower for cool water.   - sensitive skin lubriderm on face, arms, and legs. Facewash sometimes. Just water on vulvar skin.     HRT  Thinking for a long time about low dose T - lowering their voice a little bit, some of the body changes. Being able to decide to go off of it. At this point doesn't want T long term but some gradual changes. Also plans eventual top surgery - as long as they have had breasts they wanted them removed.     Review of Systems   Pertinent positives and negatives per HPI.        Objective    /74   Pulse 71   Temp 98.5  F (36.9  C) (Oral)   Resp 14   Ht 1.632 m (5' 4.25\")   Wt 58.7 kg (129 lb 6.4 oz)   SpO2 98%   Breastfeeding No   BMI 22.04 kg/m    Body mass index is 22.04 kg/m .  Physical Exam   GENERAL: alert, cooperative, in no acute distress  HEENT: sclera clear, moist mucous membranes   PULM: normal respiratory effort   CV: regular rate, extremities warm and well perfused   : erythema without scaling, erosion or fissuring to labia minora, labial folds. Vaginal mucosa pink and well rugated. Moderate thick white discharge in vault, with some white plaques clinging to cervix, removed easily with swab. Cervix visualized and normal " in appearance. IUD strings normal position through os.   NEURO: alert and oriented, grossly intact, moves all extremities, normal gait   SKIN: no rashes or lesions visualized   PSYCH: euthymic affect

## 2021-05-20 ENCOUNTER — OFFICE VISIT (OUTPATIENT)
Dept: FAMILY MEDICINE | Facility: CLINIC | Age: 26
End: 2021-05-20
Payer: COMMERCIAL

## 2021-05-20 VITALS
HEART RATE: 71 BPM | TEMPERATURE: 98.5 F | BODY MASS INDEX: 22.09 KG/M2 | RESPIRATION RATE: 14 BRPM | OXYGEN SATURATION: 98 % | HEIGHT: 64 IN | WEIGHT: 129.4 LBS | DIASTOLIC BLOOD PRESSURE: 74 MMHG | SYSTOLIC BLOOD PRESSURE: 105 MMHG

## 2021-05-20 DIAGNOSIS — F41.8 DEPRESSION WITH ANXIETY: ICD-10-CM

## 2021-05-20 DIAGNOSIS — N76.0 VAGINITIS AND VULVOVAGINITIS: Primary | ICD-10-CM

## 2021-05-20 DIAGNOSIS — B37.31 VULVOVAGINAL CANDIDIASIS: ICD-10-CM

## 2021-05-20 DIAGNOSIS — F64.9 GENDER DYSPHORIA: ICD-10-CM

## 2021-05-20 DIAGNOSIS — Z12.4 CERVICAL CANCER SCREENING: ICD-10-CM

## 2021-05-20 DIAGNOSIS — F41.1 GAD (GENERALIZED ANXIETY DISORDER): ICD-10-CM

## 2021-05-20 LAB
BACTERIA: NORMAL
CLUE CELLS: NORMAL
MOTILE TRICHOMONAS: NEGATIVE
ODOR: NORMAL
PH WET PREP: <4.5 (ref 3.8–4.5)
WBC WET PREP: NORMAL (ref 2–5)
YEAST: NORMAL

## 2021-05-20 PROCEDURE — G0145 SCR C/V CYTO,THINLAYER,RESCR: HCPCS | Performed by: STUDENT IN AN ORGANIZED HEALTH CARE EDUCATION/TRAINING PROGRAM

## 2021-05-20 PROCEDURE — 99215 OFFICE O/P EST HI 40 MIN: CPT | Performed by: STUDENT IN AN ORGANIZED HEALTH CARE EDUCATION/TRAINING PROGRAM

## 2021-05-20 PROCEDURE — 87210 SMEAR WET MOUNT SALINE/INK: CPT | Performed by: STUDENT IN AN ORGANIZED HEALTH CARE EDUCATION/TRAINING PROGRAM

## 2021-05-20 RX ORDER — FLUCONAZOLE 150 MG/1
150 TABLET ORAL
Qty: 3 TABLET | Refills: 0 | Status: SHIPPED | OUTPATIENT
Start: 2021-05-20 | End: 2021-05-27

## 2021-05-20 ASSESSMENT — MIFFLIN-ST. JEOR: SCORE: 1310.92

## 2021-05-20 ASSESSMENT — PATIENT HEALTH QUESTIONNAIRE - PHQ9: SUM OF ALL RESPONSES TO PHQ QUESTIONS 1-9: 11

## 2021-05-20 NOTE — PATIENT INSTRUCTIONS
Informed Consent Form for Masculinizing Medication (Testosterone)      This form refers to the use of testosterone by persons who wish to masculinize their body. While there are risks associated with taking testosterone, when appropriately prescribed it can greatly improve mental health and quality of life. Please seek another opinion if you want additional perspective on any aspect of your care.    Masculinizing Effects    1. I understand that testosterone may be prescribed to masculinize my body.    2.   I understand that the masculinizing effects of testosterone can take several months to a year to become noticeable, that the rate and degree of change can t be predicted and is different for every person, and that changes may not be complete for 2-5 years after I start testosterone.    3.   I understand that these changes will likely be permanent even if I stop taking testosterone:    Lower voice pitch (i.e., voice becoming deeper).    Increased growth of hair, with thicker/coarser hairs, on arms, legs, chest, back, and abdomen.    Gradual growth of moustache/facial hair.    Hair loss at the temples and crown of the head, with the possibility of becoming completely bald.    Genital changes may or may not be permanent if testosterone is stopped. These include clitoral growth (typically 1-3 cm) and vaginal dryness.    4.   I understand that the following changes are usually not permanent (that is, they will likely reverse if I stop taking testosterone).    Acne, which may be severe and can cause permanent scarring if not treated.     Fat may redistribute to a more masculine pattern (decreased on buttocks/hips/thighs, increased in abdomen - changing from  pear shape  to  apple shape ).    Increased muscle mass and upper body strength.    Increased libido (sex drive).    Menstrual periods typically stop within 3-6 months of starting testosterone.    5.   I understand that it is not known what the effects of  testosterone are on fertility.     Fertility is reduced and I may never be able to get pregnant, even if I stop testosterone.     On the other hand, even if my period stops, it may still be possible for me to get pregnant, and I am aware of birth control options (if applicable).     I have been informed that I CANNOT take testosterone if I am pregnant.     I should consider egg banking if I desire biological children.     6. I understand that there are some aspects of my body that will not be changed by testosterone:    Breasts may appear slightly smaller due to fat loss, but will not substantially shrink.    Although voice pitch will likely drop, other aspects of speech will not become more masculine.      Risks of Testosterone    1. I understand that the medical effects and safety of testosterone are not fully understood, and that there may be long-term risks that are not yet known.    2.   I understand that I am strongly advised not to take more testosterone than I am prescribed, as this increases health risks. I have been informed that taking more will not make masculinization happen more quickly or increase the degree of change.    3.   I understand that testosterone can cause changes that increase my risk of heart disease, including:    decreasing good cholesterol (HDL) and increasing bad cholesterol (LDL)    increasing blood pressure    increasing deposits of fat around my internal organs    4.   I have been advised that my risks of heart disease are greater if people in my family have had heart disease, if I am overweight, or if I smoke.    5.   I have been advised that heart health checkups, including monitoring of my weight and cholesterol levels, should be done periodically as long as I am taking testosterone.    6.   I understand that testosterone can damage the liver, possibly leading to liver disease. I have been advised that I should be monitored for as long as I am taking testosterone.    7.   I  understand that testosterone can increase the amount of red blood cells and hemoglobin in my blood. While the increase is usually only to a normal male range (which does not pose health risks), a high increase can cause potentially life-threatening problems such as stroke and heart attack. I have been advised that my blood should be monitored periodically while I am taking testosterone.    8.   I understand that taking testosterone can increase my risk for diabetes by decreasing my body s response to insulin, causing weight gain, and increasing deposits of fat around my internal organs. I have been advised that my fasting blood glucose should be monitored periodically while I am taking testosterone.    9.   I understand that it is not known if testosterone increases the risk of ovarian, breast, or uterine cancer.    10. I understand that taking testosterone can lead to my cervix and the walls of my vagina becoming more fragile, and that this can lead to tears or abrasions that increase the risk of sexually transmitted infections (including HIV) if I have vaginal sex - no matter what the gender of my partner is. I have been advised that zari discussion with my doctor about my sexual practices can help determine how best to prevent and monitor for sexually transmitted infections.    11. I have been informed that testosterone can cause headaches or migraines. I understand that if I am frequently having headaches or migraines, or the pain is unusually severe, it is recommended that I talk with my healthcare provider.    12. I understand that testosterone can cause emotional changes, including increased irritability, frustration, and anger. I have been advised that my doctor can assist me in finding resources to explore and cope with these changes.    13. I understand that testosterone will result in changes that will be noticeable by other people, and that some people in similar circumstances have experienced  harassment, discrimination, and violence, while others have lost support of loved ones. I have been advised that my doctor can assist me in finding advocacy and support resources.      Prevention of Medical Complications    1. I agree to take testosterone as prescribed and to tell my doctor if I am not happy with the treatment or am experiencing any problems.    2. I understand that the right dose or type of medication prescribed for me may not be the same as for someone else.    3. I understand that physical examinations and blood tests are needed on a regular basis to check for negative side effects of testosterone.    4. I understand that testosterone can interact with other medications (including other sources of hormones), dietary supplements, herbs, alcohol, and street drugs. I understand that being honest with my doctor about what else I am taking will help prevent medical complications that could be life-threatening. I have been informed that I will continue to get medical care no matter what information I share, and will be kept confidential.    5. I understand that some medical conditions make it dangerous to take testosterone. I agree that I will be checked for risky conditions before the decision to take testosterone is made.    6. I understand that I can choose to stop taking testosterone at any time, and that it is advised that I do this with the help of my doctor to make sure there are no negative reactions to stopping. I understand that my doctor may suggest I reduce or stop taking testosterone if there are severe side effects or health risks that can t be controlled.      +++++++++++++++++++++++++++++++++++         Individual Mental Health Practitioners   Therapist Children Adolescents Adults Family Other   KENDELL Hancock, DCSW  [address]  [facility] [address]  Work Phone: [phone]   YES YES    Jaylene Barron PsyD, LP  [address]  [place]   [address]  PH: [phone]   YES YES    Mayela Diamond, LICSW  [address]  [place] [address]  [facility]  [phone]   Yes Yes  On Busline  Couples too   Yazmin Valadez PsyD, LICSW  [facility]  [address]  [place], [place]   PH:[phone] YES YES YES YES Business consulting   Alvaro Prakash, PhD, LP  [address]  [place] [address]  PH: [phone]  Nivela  Older Adolescents YES  On busline   Elijah Dimas, [facility] Hospital[facility]  [address]  [place] [address]  PH: [phone]  [employer]s  YES YES YES Busline   Vanesa Madrigal M.Ed, [facility] HospitalSW, [facility]  [address] [place], [place]   [phone]  [email or web address]   YES YES  Experienced in Trans Veterans    Stefanie Hunt MA, LMFT, CST  [address]  [place], [place] [address] [phone]  eFans  YES YES YES Sex positive therapy    Viviana(Jerry) Josue Jones MSW,LICSW  [address], [place] [address]  Phone: [phone]     YES YES YES    FIGUEROA Price MFA, PsRichelle, LP   [address]  [place], [place] [id number] [phone]  SexfromBig red truck driving school.[employer]   YES YES Sexuality groups  [facility] for [facility]        Mental Health Clinics   [facility]  Patients: Children, adolescents, adults  Locations:  [address]  [place] [address]  [address]  [place] [address]  [address]  [place] [address] [address]  [place] [address]    Appointment Scheduling   [phone]  [email or web address]  [facility]  All ages- Also offers in-home therapy and sliding fee  [address]  [place] [address]   Phone: [phone]  Shogether    [facility] [place] [address]  Intake line  [phone]  [email or web address]://[email or web address].theHe[facility] Center[facility]part[facility].org/programsservices/counseling/transgender-mental-health/    Transgender Mental Health Team  Therapists Children Adolescents Adults Family Other information        Arabic and English  [facility] Location- [facility]   June Chen MA, LMFT YES YES YES  [facility] Location   Kailee Green-Myrtle, MSW, LICSW   YES YES EMDR  [place] Location     Gamaliel Crain MA, LP YES YES YES YES [facility] Location- [facility] Sexual medicine-Psychology  [email or web address]  [place] Location   [address] [place] [address]  Phone [phone]  Therapists Children Adolescents Adults Family Other   Rachele Jasmine PsyD, LP   YES YES-couples    Vishal Trent PsyD, LP   YES     Grace Khoury, PhD, LP   YES       [place] Location  [address] [place] [address]  Phone: [phone]  Therapists Children Adolescents Adults Family Other   Dodie Leonardo PsyD, LP   YES YES    Betzy Clements PsyD, LP   YES       [facility]  Patients-Queer and Trans Youth and Family counseling serving ages 12-26  [address] [place] [address]  Phone: [phone]  http://Reclaim.[facility] Services, [facility]  [facility]  [address]  [place] [address]  Main line: [phone]  http://"[email or web address]    [facility]/ Program in Human Sexuality  [address]  [place] [address]  PH: [phone]   [email or web address]://[email or web address].[facility].edu/clinic-center-sexual-health/transgender-health-services    Therapists Children Adolescents Adults Family Other information   Odalys Huerta PsRichelle YES YES YES     Ally Edge, PhD YES YES YES     Kendy Pinto PsyD  YES YES     Janice Ramos, PhD YES YES YES     Garcia King, PhD, MPH YES YES   Sexual and Gender minority health   Multiple other Post-Doctoral fellows practicing at this location as  well           [facility]  [place] Location: [address] [place] [address]  [place] Location: [address] [address]  Toll Free: [phone]  Phone: [phone]   [email or web address]://www.[email or web address]/therapeutic-services    Daytime LGBTQ+ DBT skills group  Groups are being added at [facility], in addition to the current evening LGBTQ+ skills group. Both skills groups are held on Mondays.  Daytime group will be 1:00pm-3:30pm and evening group (currently full) is 5:00pm-7:30pm.  [facility] offers a certified DBT program. Clients commit to one year of participation in skills group and individual DBT therapy. DBT individual therapy can be done by current therapist if the therapist is intensively trained and part of consultation group of intensively trained therapists, and if the therapist offers 24/7 coaching calls. Otherwise, the client can work with one of our DBT therapists and then return to their previous individual therapist at the conclusion of the 1-year program.  MWP Member Anabell Murphy MA, [facility] for Psychology   [phone]  www.[email or web address] Centerpsychology.Inflection  [address] [facility] [address], [facility] Health Resources    [facility]  [address] [place] [address]  Phone: [phone] or toll free, [phone]  [email or web address]    [facility] LGBT Residential CD Treatment Program  [address] [place] [address]  [phone]  http://www.[employer]/programs/residential/rfzwrcrpb-taar-wtsmzyaaduq-[facility]-[place]/    Additional Provider Directories  [facility]- Provider Directory for all services  http://www.rainbowhealth.org/resources-for-you/provider-directory/    [place]'s lesbian call bisexual transgender & allied mental health providers' network  http://www.lgbttherapists.org/    Provider Directory for Health Related resources in [place]  http://mnlgbtqdirectory.org    Directory  for Caridad rosado gender non-conforming aware Providers  http://www.ghislaine.org

## 2021-05-25 LAB
COPATH REPORT: NORMAL
PAP: NORMAL

## 2021-07-30 DIAGNOSIS — F41.1 GAD (GENERALIZED ANXIETY DISORDER): ICD-10-CM

## 2021-07-30 RX ORDER — PROPRANOLOL HYDROCHLORIDE 80 MG/1
80 CAPSULE, EXTENDED RELEASE ORAL DAILY
Qty: 30 CAPSULE | Refills: 3 | Status: SHIPPED | OUTPATIENT
Start: 2021-07-30 | End: 2021-09-23

## 2021-07-30 NOTE — ORAL ONC MGMT
Propranolol er 80mg    LAST FILLED DATE: 06-24-21  LAST FILLED STRENGTH: 80 MG  LAST FILLED QTY: 30  LAST OFFICE VISIT: 05-20-21    Karen ZAPATA CPhT @    [facility]  [address] [place] [address]  Phone: [phone]  Fax: [phone]

## 2021-09-23 ENCOUNTER — MYC MEDICAL ADVICE (OUTPATIENT)
Dept: FAMILY MEDICINE | Facility: CLINIC | Age: 26
End: 2021-09-23

## 2021-09-23 ENCOUNTER — OFFICE VISIT (OUTPATIENT)
Dept: FAMILY MEDICINE | Facility: CLINIC | Age: 26
End: 2021-09-23
Payer: COMMERCIAL

## 2021-09-23 VITALS
RESPIRATION RATE: 16 BRPM | DIASTOLIC BLOOD PRESSURE: 84 MMHG | OXYGEN SATURATION: 98 % | HEART RATE: 77 BPM | SYSTOLIC BLOOD PRESSURE: 120 MMHG | HEIGHT: 64 IN | TEMPERATURE: 97.7 F | WEIGHT: 135 LBS | BODY MASS INDEX: 23.05 KG/M2

## 2021-09-23 DIAGNOSIS — B37.31 RECURRENT CANDIDIASIS OF VAGINA: Primary | ICD-10-CM

## 2021-09-23 DIAGNOSIS — F64.9 GENDER DYSPHORIA: Primary | ICD-10-CM

## 2021-09-23 DIAGNOSIS — Z00.00 HEALTH MAINTENANCE EXAMINATION: ICD-10-CM

## 2021-09-23 DIAGNOSIS — F41.1 GAD (GENERALIZED ANXIETY DISORDER): ICD-10-CM

## 2021-09-23 DIAGNOSIS — N76.0 VAGINITIS AND VULVOVAGINITIS: ICD-10-CM

## 2021-09-23 DIAGNOSIS — F41.9 ANXIETY: ICD-10-CM

## 2021-09-23 LAB
ALBUMIN SERPL-MCNC: 4.1 G/DL (ref 3.4–5)
ALP SERPL-CCNC: 46 U/L (ref 40–150)
ALT SERPL W P-5'-P-CCNC: 19 U/L (ref 0–70)
AST SERPL W P-5'-P-CCNC: 11 U/L (ref 0–45)
BASOPHILS # BLD AUTO: 0 10E3/UL (ref 0–0.2)
BASOPHILS NFR BLD AUTO: 1 %
BILIRUB DIRECT SERPL-MCNC: 0.3 MG/DL (ref 0–0.2)
BILIRUB SERPL-MCNC: 1.9 MG/DL (ref 0.2–1.3)
CHOLEST SERPL-MCNC: 180 MG/DL
CLUE CELLS: ABNORMAL
EOSINOPHIL # BLD AUTO: 0.1 10E3/UL (ref 0–0.7)
EOSINOPHIL NFR BLD AUTO: 1 %
ERYTHROCYTE [DISTWIDTH] IN BLOOD BY AUTOMATED COUNT: 11.6 % (ref 10–15)
FASTING STATUS PATIENT QL REPORTED: NO
HCT VFR BLD AUTO: 42.6 %
HCV AB SERPL QL IA: NONREACTIVE
HDLC SERPL-MCNC: 64 MG/DL
HGB BLD-MCNC: 14.5 G/DL (ref 11.7–17.7)
IMM GRANULOCYTES # BLD: 0 10E3/UL
IMM GRANULOCYTES NFR BLD: 0 %
LDLC SERPL CALC-MCNC: 87 MG/DL
LYMPHOCYTES # BLD AUTO: 1.8 10E3/UL (ref 0.8–5.3)
LYMPHOCYTES NFR BLD AUTO: 28 %
MCH RBC QN AUTO: 29.7 PG (ref 26.5–33)
MCHC RBC AUTO-ENTMCNC: 34 G/DL (ref 31.5–36.5)
MCV RBC AUTO: 87 FL (ref 78–100)
MONOCYTES # BLD AUTO: 0.6 10E3/UL (ref 0–1.3)
MONOCYTES NFR BLD AUTO: 10 %
NEUTROPHILS # BLD AUTO: 3.9 10E3/UL (ref 1.6–8.3)
NEUTROPHILS NFR BLD AUTO: 60 %
NONHDLC SERPL-MCNC: 116 MG/DL
PLATELET # BLD AUTO: 312 10E3/UL (ref 150–450)
PROT SERPL-MCNC: 7.2 G/DL (ref 6.8–8.8)
RBC # BLD AUTO: 4.89 10E6/UL (ref 3.8–5.9)
TRICHOMONAS, WET PREP: ABNORMAL
TRIGL SERPL-MCNC: 143 MG/DL
WBC # BLD AUTO: 6.4 10E3/UL (ref 4–11)
WBC'S/HIGH POWER FIELD, WET PREP: ABNORMAL
YEAST, WET PREP: PRESENT

## 2021-09-23 PROCEDURE — 85025 COMPLETE CBC W/AUTO DIFF WBC: CPT | Performed by: STUDENT IN AN ORGANIZED HEALTH CARE EDUCATION/TRAINING PROGRAM

## 2021-09-23 PROCEDURE — 36415 COLL VENOUS BLD VENIPUNCTURE: CPT | Performed by: STUDENT IN AN ORGANIZED HEALTH CARE EDUCATION/TRAINING PROGRAM

## 2021-09-23 PROCEDURE — 80061 LIPID PANEL: CPT | Performed by: STUDENT IN AN ORGANIZED HEALTH CARE EDUCATION/TRAINING PROGRAM

## 2021-09-23 PROCEDURE — 87210 SMEAR WET MOUNT SALINE/INK: CPT | Performed by: STUDENT IN AN ORGANIZED HEALTH CARE EDUCATION/TRAINING PROGRAM

## 2021-09-23 PROCEDURE — 84403 ASSAY OF TOTAL TESTOSTERONE: CPT | Mod: KX | Performed by: STUDENT IN AN ORGANIZED HEALTH CARE EDUCATION/TRAINING PROGRAM

## 2021-09-23 PROCEDURE — 86803 HEPATITIS C AB TEST: CPT | Performed by: STUDENT IN AN ORGANIZED HEALTH CARE EDUCATION/TRAINING PROGRAM

## 2021-09-23 PROCEDURE — 99214 OFFICE O/P EST MOD 30 MIN: CPT | Performed by: STUDENT IN AN ORGANIZED HEALTH CARE EDUCATION/TRAINING PROGRAM

## 2021-09-23 PROCEDURE — 80076 HEPATIC FUNCTION PANEL: CPT | Performed by: STUDENT IN AN ORGANIZED HEALTH CARE EDUCATION/TRAINING PROGRAM

## 2021-09-23 PROCEDURE — 87102 FUNGUS ISOLATION CULTURE: CPT | Performed by: STUDENT IN AN ORGANIZED HEALTH CARE EDUCATION/TRAINING PROGRAM

## 2021-09-23 RX ORDER — PROPRANOLOL HYDROCHLORIDE 80 MG/1
80 CAPSULE, EXTENDED RELEASE ORAL DAILY
Qty: 90 CAPSULE | Refills: 3 | Status: SHIPPED | OUTPATIENT
Start: 2021-09-23 | End: 2022-10-11

## 2021-09-23 RX ORDER — TESTOSTERONE 1.62 MG/G
1 GEL TRANSDERMAL DAILY
Qty: 75 G | Refills: 1 | Status: SHIPPED | OUTPATIENT
Start: 2021-09-23

## 2021-09-23 ASSESSMENT — ANXIETY QUESTIONNAIRES
2. NOT BEING ABLE TO STOP OR CONTROL WORRYING: NOT AT ALL
5. BEING SO RESTLESS THAT IT IS HARD TO SIT STILL: SEVERAL DAYS
1. FEELING NERVOUS, ANXIOUS, OR ON EDGE: NOT AT ALL
GAD7 TOTAL SCORE: 2
3. WORRYING TOO MUCH ABOUT DIFFERENT THINGS: NOT AT ALL
6. BECOMING EASILY ANNOYED OR IRRITABLE: NOT AT ALL
IF YOU CHECKED OFF ANY PROBLEMS ON THIS QUESTIONNAIRE, HOW DIFFICULT HAVE THESE PROBLEMS MADE IT FOR YOU TO DO YOUR WORK, TAKE CARE OF THINGS AT HOME, OR GET ALONG WITH OTHER PEOPLE: SOMEWHAT DIFFICULT
7. FEELING AFRAID AS IF SOMETHING AWFUL MIGHT HAPPEN: NOT AT ALL

## 2021-09-23 ASSESSMENT — MIFFLIN-ST. JEOR: SCORE: 1325.74

## 2021-09-23 ASSESSMENT — PATIENT HEALTH QUESTIONNAIRE - PHQ9
SUM OF ALL RESPONSES TO PHQ QUESTIONS 1-9: 8
5. POOR APPETITE OR OVEREATING: SEVERAL DAYS

## 2021-09-23 NOTE — PROGRESS NOTES
Assessment and Plan       Assessment & Plan     Anxiety  WILNER (generalized anxiety disorder)  Anxiety well controlled on beta blocker. Sertaline 50mg daily, same plan for grow lights on a timer this winter. Room to go up on sertraline if needed.   - sertraline (ZOLOFT) 50 MG tablet  Dispense: 90 tablet; Refill: 3  - propranolol ER (INDERAL LA) 80 MG 24 hr capsule  Dispense: 90 capsule; Refill: 3    Vaginitis and vulvovaginitis  Hx recurrent itching - past wet preps negaitve but most c/w yeast. Suspect a component of LS at this point as well. Repeat today w/ culture and anticipate possible suppressive fluconazole vs topical CS for lichen sclerosis.   - Yeast culture  - Yeast culture  - Wet preparation - Clinic Collect    Health maintenance examination  - Hepatitis C Screen Reflex to HCV RNA Quant and Genotype  - Hepatitis C Screen Reflex to HCV RNA Quant and Genotype    Gender dysphoria  Non-binary gender identity, meets criteria for gender dysphoria. Desires low dose testosterone treatment, eventual top surgery. Labs and start T gel today. Reviewed informed consent form in AVS from last visit, all questions answered.   - Testosterone Total  - Hepatic Panel  - CBC with Diff Plt  - Lipid Cascade  - testosterone (ANDROGEL 1.62 % PUMP) 20.25 MG/ACT gel  Dispense: 75 g; Refill: 1        Educated about 3 parts of evaluation before starting HRT    Physical health    Mental health    Informed consent    Medical safety for hormones    Lacks contraindications to hormonal therapy    Medication plan: masculinizing hormone therapy with testosterone     Administration route:  topical    Counseling completed today    Contraception: IUD    Educated about testosterone as absolute contraindication in pregnancy: Yes    Fertility plan if starts cross-sex hormones: no conception desired       Mental health assessment    Completed by Soraya Sandhu DO today    Informed consent process    Yes --- Is able to provide informed consent      Yes --- Likely to take hormones in a responsible manner    Yes --- Discussed physical effects, benefits, and risk assessment & modification    Yes --- Discussed the clinical and biochemical monitoring of hormonal changes and the potential impact on reproductive health & fertility      We discussed thoroughly the risks/benefits/alternatives of this treatment. Questions elicited and answered in reviewing the informed consent document.  Pt is fully prepared to start hormones and is able to reason through risks and management. Questions elicited and answered and patient verbally consents to hormone treatment.  (This assessment is based on the 2011 published Standards of Care for the Health of Transsexual, Transgender, and Gender-Nonconforming People, Version 7, by the World Professional Association of Transgender Health. WPATH SOC Guidelines)      Ordering of each unique test  Prescription drug management  36 minutes spent on the date of the encounter doing chart review, history and exam, documentation and further activities per the note    Return in about 3 months (around 12/23/2021) for Follow up, with me. - repeat HRT labs     Soraya Sandhu [facility] DARWIN Holbrook is a 25 year old who presents for the following health issues      Chief Complaint   Patient presents with     Yeast Infection     pt has ongoing yeast infection; took an overthe counter one time suppository but that was three weeks ago and pt has had no relief of symptoms - discharge and itchiness.      Recheck Medication     Folow up on on SSRI; pt would also like to chat about hormones and how that has been going.         HPI     Last visit:   Had discussed giving mood a few months to stabilize before starting T   Establish w/ therapist for eventual top surg nnote     3 weeks ago that they took it  Kind of ignorable but still doesn't feel 100% right   Not much discharge after the treatment finished      Depression and Anxiety Follow-Up    Hard time getting out of the house to do errands, feeding myself, cleaning apartment. Definitely not feeling 100%.     Seasonal depression - tried the SAD light in the past. Last year had grow lights on a timer to wake them up.     Good connection w/ their therapist.     Social History     Tobacco Use     Smoking status: Former Smoker     Packs/day: 0.00     Years: 0.00     Pack years: 0.00     Smokeless tobacco: Never Used   Vaping Use     Vaping Use: Some days     Substances: Nicotine, Flavoring     Devices: Refillable tank   Substance Use Topics     Alcohol use: Yes     Comment: 2-3 drinks a week      Drug use: No     PHQ 3/25/2021 5/20/2021 9/23/2021   PHQ-9 Total Score 15 11 8   Q9: Thoughts of better off dead/self-harm past 2 weeks Not at all Not at all Not at all     WILNER-7 SCORE 9/4/2019 3/25/2021 9/23/2021   Total Score 14 16 2       HRT/gender info -- interested in HRT Today. Reviewed informed consent and no questions.   Name: Yusef  Pronouns: They/Them/Their/Theirs        Gender Identity       How would you describe your internal gender identity? Has felt non-binary but not had the word for it for a very long time. At least since high school. Agender feels the most right. Doesn't feel a strong connection to gender in general. Likes to present more androgynously.       In an ideal world, what physical characteristics would you want? Slightly loewr voice, hates their hips. Eventually wants top surgery.         Gender History       When did you first recognize that your body and identity didn t match?  o See above    What parts of your body or presentation make you feel uncomfortable or cause dysphoria?  o See above    Have you ever seen a healthcare provider for gender-affirming care?   o No  o Previous HRT: NO      Support Network       Have you shared this part of your identity with anyone?   o Yes    Do you have a support network or people in your life who help you  "feel affirmed?   o Yes:    Are you out at work, school or home?   o Yes: to close friends and current partner. Mom is suspicious - has asked about pronouns but hasn't had that convo w/ mom yet. Mom is accepting of other queer and trans friends. Hasn't had that convo w/ her and her sisters.       Social History     Relationships      Fertility plans? No Interest in cryopreservation? No     Contraception? IUD        Mental Health Assessment       Chemical use history: No    Mental Health diagnosis history    Depression, unspecified (MDD vs PDD (dysthymia) vs BPAD 2)    Social Anxiety Disorder    Generalized Anxiety Disorder    Autism Spectrum Disorder (by history)    Mental health hospitalizations: No    History of suicide attempts? No     Current suicidal thoughts? No     Therapist? Yes      Review of Systems   Pertinent positives and negatives per HPI.        Objective    /84   Pulse 77   Temp 97.7  F (36.5  C) (Oral)   Resp 16   Ht 1.615 m (5' 3.58\")   Wt 61.2 kg (135 lb)   SpO2 98%   BMI 23.48 kg/m    Body mass index is 23.48 kg/m .  Physical Exam   GENERAL: alert, cooperative, in no acute distress  HEENT: sclera clear, moist mucous membranes   PULM: normal respiratory effort   CV: regular rate, extremities warm and well perfused   : deferred  NEURO: alert and oriented, grossly intact, moves all extremities, normal gait   SKIN: no rashes or lesions visualized   PSYCH: euthymic affect       "

## 2021-09-24 ASSESSMENT — ANXIETY QUESTIONNAIRES: GAD7 TOTAL SCORE: 2

## 2021-09-25 LAB — TESTOST SERPL-MCNC: 28 NG/DL (ref 8–950)

## 2021-09-27 PROBLEM — B37.31 RECURRENT CANDIDIASIS OF VAGINA: Status: ACTIVE | Noted: 2021-09-27

## 2021-09-27 LAB — BACTERIA VAG AEROBE CULT: NO GROWTH

## 2021-09-27 RX ORDER — FLUCONAZOLE 150 MG/1
TABLET ORAL
Qty: 30 TABLET | Refills: 0 | Status: SHIPPED | OUTPATIENT
Start: 2021-09-27 | End: 2022-12-02

## 2021-09-30 ENCOUNTER — TELEPHONE (OUTPATIENT)
Dept: FAMILY MEDICINE | Facility: CLINIC | Age: 26
End: 2021-09-30

## 2021-10-04 NOTE — TELEPHONE ENCOUNTER
Central Prior Authorization Team   Phone: [phone]      PA Initiation    Medication: fluconazole (DIFLUCAN) 150 MG tablet  Insurance Company: [employer] - Phone [phone] Fax [phone]  Pharmacy Filling the Rx: [facility] [place], [place] - [address]  Filling Pharmacy Phone: [phone]  Filling Pharmacy Fax:    Start Date: 10/4/2021    Started PA on CMM and a response of Drug is covered by current benefit plan. No further PA activity needed.  Called and spoke with Kehinde at [employer] to complete quantity PA via phone.  Case [id number]

## 2021-10-04 NOTE — TELEPHONE ENCOUNTER
Prior Authorization Approval    Authorization Effective Date: 9/4/2021  Authorization Expiration Date: 10/4/2022  Medication: fluconazole (DIFLUCAN) 150 MG tablet-APPROVED  Approved Dose/Quantity:   Reference #:     Insurance Company: [employer] - Phone [phone] Fax [phone]  Expected CoPay:       CoPay Card Available:      Foundation Assistance Needed:    Which Pharmacy is filling the prescription (Not needed for infusion/clinic administered): [facility] [place], [place] - [address]   Pharmacy Notified: Yes  Patient Notified: No    Got verbal approval.  Informed pharmacy of approval since they already sold 2 to the patient.

## 2021-10-07 NOTE — TELEPHONE ENCOUNTER
Please see 9/30 [employer] message encounter - message from pt today 10/6 states she was only given 4 additional fluconazole tabs for total of 6. Prescription was ordered for 30 tabs. Can pool please assist with calling pharmacy to clarify the problem?

## 2021-10-07 NOTE — TELEPHONE ENCOUNTER
She can't get the full 30 tabs at one time since after the initial of the medication is is only taking one tab per week.

## 2021-10-09 ENCOUNTER — HEALTH MAINTENANCE LETTER (OUTPATIENT)
Age: 26
End: 2021-10-09

## 2022-01-29 ENCOUNTER — HEALTH MAINTENANCE LETTER (OUTPATIENT)
Age: 27
End: 2022-01-29

## 2022-07-25 ENCOUNTER — ANCILLARY PROCEDURE (OUTPATIENT)
Dept: CARDIOLOGY | Facility: CLINIC | Age: 27
End: 2022-07-25
Attending: STUDENT IN AN ORGANIZED HEALTH CARE EDUCATION/TRAINING PROGRAM
Payer: COMMERCIAL

## 2022-07-25 DIAGNOSIS — Z82.79 FAMILY HISTORY OF BICUSPID AORTIC VALVE: ICD-10-CM

## 2022-07-25 LAB — LVEF ECHO: NORMAL

## 2022-07-25 PROCEDURE — 93306 TTE W/DOPPLER COMPLETE: CPT | Performed by: STUDENT IN AN ORGANIZED HEALTH CARE EDUCATION/TRAINING PROGRAM

## 2022-09-17 ENCOUNTER — HEALTH MAINTENANCE LETTER (OUTPATIENT)
Age: 27
End: 2022-09-17

## 2022-11-07 ASSESSMENT — ENCOUNTER SYMPTOMS
ABDOMINAL PAIN: 0
SHORTNESS OF BREATH: 0
COUGH: 0
HEARTBURN: 1
CHILLS: 0
FEVER: 0
DYSURIA: 0
PALPITATIONS: 0
PARESTHESIAS: 0
FREQUENCY: 0
SORE THROAT: 0
WEAKNESS: 0
ARTHRALGIAS: 0
JOINT SWELLING: 0
CONSTIPATION: 0
HEADACHES: 0
HEMATURIA: 0
BREAST MASS: 0
DIZZINESS: 0
HEMATOCHEZIA: 0
MYALGIAS: 0
NAUSEA: 0
EYE PAIN: 0
DIARRHEA: 0
NERVOUS/ANXIOUS: 0

## 2022-11-11 ENCOUNTER — OFFICE VISIT (OUTPATIENT)
Dept: FAMILY MEDICINE | Facility: CLINIC | Age: 27
End: 2022-11-11
Payer: COMMERCIAL

## 2022-11-11 VITALS
BODY MASS INDEX: 24.17 KG/M2 | OXYGEN SATURATION: 98 % | RESPIRATION RATE: 16 BRPM | TEMPERATURE: 98.8 F | DIASTOLIC BLOOD PRESSURE: 80 MMHG | SYSTOLIC BLOOD PRESSURE: 114 MMHG | WEIGHT: 139 LBS | HEART RATE: 66 BPM

## 2022-11-11 DIAGNOSIS — Z23 NEED FOR PROPHYLACTIC VACCINATION AND INOCULATION AGAINST INFLUENZA: ICD-10-CM

## 2022-11-11 DIAGNOSIS — Z00.00 ENCOUNTER FOR HEALTH MAINTENANCE EXAMINATION IN ADULT: Primary | ICD-10-CM

## 2022-11-11 DIAGNOSIS — N76.0 VAGINITIS AND VULVOVAGINITIS: ICD-10-CM

## 2022-11-11 DIAGNOSIS — Z23 HIGH PRIORITY FOR 2019-NCOV VACCINE: ICD-10-CM

## 2022-11-11 LAB
CLUE CELLS: NORMAL
KOH PREPARATION: ABNORMAL
KOH PREPARATION: ABNORMAL
TRICHOMONAS, WET PREP: NORMAL
WBC'S/HIGH POWER FIELD, WET PREP: NORMAL
YEAST, WET PREP: NORMAL

## 2022-11-11 PROCEDURE — 90686 IIV4 VACC NO PRSV 0.5 ML IM: CPT | Performed by: STUDENT IN AN ORGANIZED HEALTH CARE EDUCATION/TRAINING PROGRAM

## 2022-11-11 PROCEDURE — 99395 PREV VISIT EST AGE 18-39: CPT | Mod: 25 | Performed by: STUDENT IN AN ORGANIZED HEALTH CARE EDUCATION/TRAINING PROGRAM

## 2022-11-11 PROCEDURE — 91312 COVID-19,PF,PFIZER BOOSTER BIVALENT: CPT | Performed by: STUDENT IN AN ORGANIZED HEALTH CARE EDUCATION/TRAINING PROGRAM

## 2022-11-11 PROCEDURE — 99213 OFFICE O/P EST LOW 20 MIN: CPT | Mod: 25 | Performed by: STUDENT IN AN ORGANIZED HEALTH CARE EDUCATION/TRAINING PROGRAM

## 2022-11-11 PROCEDURE — 87210 SMEAR WET MOUNT SALINE/INK: CPT | Performed by: STUDENT IN AN ORGANIZED HEALTH CARE EDUCATION/TRAINING PROGRAM

## 2022-11-11 PROCEDURE — 0124A COVID-19,PF,PFIZER BOOSTER BIVALENT: CPT | Performed by: STUDENT IN AN ORGANIZED HEALTH CARE EDUCATION/TRAINING PROGRAM

## 2022-11-11 PROCEDURE — 90471 IMMUNIZATION ADMIN: CPT | Performed by: STUDENT IN AN ORGANIZED HEALTH CARE EDUCATION/TRAINING PROGRAM

## 2022-11-11 ASSESSMENT — ENCOUNTER SYMPTOMS
ARTHRALGIAS: 0
SORE THROAT: 0
DIARRHEA: 0
PALPITATIONS: 0
DYSURIA: 0
COUGH: 0
CHILLS: 0
FREQUENCY: 0
JOINT SWELLING: 0
EYE PAIN: 0
HEMATURIA: 0
FEVER: 0
HEADACHES: 0
WEAKNESS: 0
ABDOMINAL PAIN: 0
MYALGIAS: 0
CONSTIPATION: 0
DIZZINESS: 0
NAUSEA: 0
SHORTNESS OF BREATH: 0
NERVOUS/ANXIOUS: 0

## 2022-11-11 NOTE — PROGRESS NOTES
SUBJECTIVE:   CC: Yusef is an 26 year old who presents for preventive health visit.     Healthy Habits:     Getting at least 3 servings of Calcium per day:  NO    Bi-annual eye exam:  NO    Dental care twice a year:  Yes    Sleep apnea or symptoms of sleep apnea:  None    Diet:  Regular (no restrictions)    Frequency of exercise:  None    Taking medications regularly:  No    Barriers to taking medications:  Problems remembering to take them    Medication side effects:  Other    PHQ-2 Total Score: 2    Additional concerns today:  Yes     Chief Complaint   Patient presents with     Physical     Concerned about yeast infection and BV     Imm/Inj     COVID-19 VACCINE     Imm/Inj     Flu Shot        Feel like I can't even trust what's supposed to feel normal anymore  Found out they are allergic to multiple different kinds of lube   - used sliquid -- irritating   - other stuff am definitely not reacting well to in a green bottle called [employer]     Did long term yeast treatment. Felt normal for a while after that but again has had ups and downs of irritation and wasn't sure if ti was getting bad again or seemed more similar to how BV was for them     Sometimes external irritation and itching   Will get better enough one day and will jsut forget about it   Still doesn't seem normal   Can be a change in discharge. Definitely not the chunky cottage cheesey that it was before   With BV before, was not even really aware that they had it bc it didn't have a noticeable odor. Was just vague discomfort.   W/ irritation, seems like sebum buildup and not like cottage cheese but just white film     Occasional spotting w/ Mirena no regular periods. Mirena in 2018.   - did have ovary pain on the R when they first got it in for about 6 mos - mostly resolved but once ada  Blue moon, that side will hurt.     Got COVID in September - was pretty mild. Sore throat and fatigue.       Today's PHQ-2 Score:   PHQ-2 ( 1999 Pfizer)  11/11/2022   Q1: Little interest or pleasure in doing things 1   Q2: Feeling down, depressed or hopeless 1   PHQ-2 Score 2   PHQ-2 Total Score (12-17 Years)- Positive if 3 or more points; Administer PHQ-A if positive -   Q1: Little interest or pleasure in doing things -   Q2: Feeling down, depressed or hopeless -   PHQ-2 Score -       Social History     Tobacco Use     Smoking status: Former     Packs/day: 0.00     Years: 0.00     Pack years: 0.00     Types: Cigarettes     Smokeless tobacco: Never   Substance Use Topics     Alcohol use: Yes     Comment: 2-3 drinks a week      If you drink alcohol do you typically have >3 drinks per day or >7 drinks per week? No    No flowsheet data found.    Reviewed orders with patient.  Reviewed health maintenance and updated orders accordingly - Yes      Breast Cancer Screening:  Any new diagnosis of family breast, ovarian, or bowel cancer? No    FHS-7: No flowsheet data found.    Patient under 40 years of age: Routine Mammogram Screening not recommended.   Pertinent mammograms are reviewed under the imaging tab.    History of abnormal Pap smear: NO - age 21-29 PAP every 3 years recommended  PAP / HPV 5/20/2021 11/26/2018   PAP (Historical) NIL NIL     Reviewed and updated as needed this visit by clinical staff   Tobacco  Allergies  Meds   Med Hx  Surg Hx  Fam Hx  Soc Hx        Reviewed and updated as needed this visit by Provider               =  Review of Systems   Constitutional: Negative for chills and fever.   HENT: Negative for congestion, ear pain, hearing loss and sore throat.    Eyes: Negative for pain and visual disturbance.   Respiratory: Negative for cough and shortness of breath.    Cardiovascular: Negative for chest pain and palpitations.   Gastrointestinal: Negative for abdominal pain, constipation, diarrhea and nausea.   Genitourinary: Negative for dysuria, frequency, genital sores, hematuria and urgency.   Musculoskeletal: Negative for arthralgias, joint  swelling and myalgias.   Skin: Negative for rash.   Neurological: Negative for dizziness, weakness and headaches.   Psychiatric/Behavioral: The patient is not nervous/anxious.         OBJECTIVE:   /80 (BP Location: Left arm, Patient Position: Sitting, Cuff Size: Adult Regular)   Pulse 66   Temp 98.8  F (37.1  C) (Oral)   Resp 16   Wt 63 kg (139 lb)   SpO2 98%   BMI 24.17 kg/m    Physical Exam  Genitourinary:         Comments: Erythema to labia minora and intralabial folds. Whitish plaque-y discharge noted in intra-labial folds within red lines indicated on figure.   Speculum exam: normal pink rugated vaginal tissue. Cervix visualized, normal in appearance with IUD strings in appropriate position. Moderate whitish grey discharge in vault.     GENERAL: healthy, alert and no distress  RESP: lungs clear to auscultation - no rales, rhonchi or wheezes  CV: regular rate and rhythm, normal S1 S2, no S3 or S4, no murmur, click or rub, no peripheral edema and peripheral pulses strong  MS: no gross musculoskeletal defects noted, no edema    Diagnostic Test Results:  Labs reviewed in Epic  KOH prep - external labial swab: + for yeast     ASSESSMENT/PLAN:   Yady was seen today for physical, imm/inj and imm/inj.    Diagnoses and all orders for this visit:    Encounter for health maintenance examination in adult  Screenings UTD, updated imms today     Vaginitis and vulvovaginitis  S/p 6 mos suppressive fluconzaole for recurrent candidiasis, now with sx returned x1-2 mos. Vulvar exam c/w candida. See MyChart exchange - wet prep + yeast, given recurrent will obtain culture swab to try and speciate and r/o resistant or non-candidal yeast species to guide treatment.   -     Wet preparation - Clinic Collect  -     Cancel: Wet preparation - Clinic Collect  -     KOH Preparation - Clinic Collect    High priority for 2019-nCoV vaccine  -     COVID-19,PF,PFIZER BOOSTER BIVALENT 12+Yrs    Need for prophylactic vaccination  "and inoculation against influenza  -     INFLUENZA VACCINE IM > 6 MONTHS VALENT IIV4 (AFLURIA/FLUZONE)      COUNSELING:  Reviewed preventive health counseling, as reflected in patient instructions    Estimated body mass index is 24.17 kg/m  as calculated from the following:    Height as of 9/23/21: 1.615 m (5' 3.58\").    Weight as of this encounter: 63 kg (139 lb).        Yusef Rodriguez reports that Yusef Rodriguez has quit smoking. Yusef Rodriguez's smoking use included cigarettes. Yusef Rodriguez has never used smokeless tobacco.      Counseling Resources:  ATP IV Guidelines  Pooled Cohorts Equation Calculator  Breast Cancer Risk Calculator  BRCA-Related Cancer Risk Assessment: FHS-7 Tool  FRAX Risk Assessment  ICSI Preventive Guidelines  Dietary Guidelines for Americans, 2010  USDA's MyPlate  ASA Prophylaxis  Lung CA Screening    DO NAHOMI [facility] DARWIN  Answers for HPI/ROS submitted by the patient on 11/7/2022  Blood in stool: No  heartburn: Yes  peripheral edema: No  mood changes: No  Skin sensation changes: No  pelvic pain: No  vaginal bleeding: No  vaginal discharge: Yes  tenderness: No  breast mass: No  breast discharge: No  impotence: No  penile discharge: No      "

## 2022-12-01 ENCOUNTER — LAB (OUTPATIENT)
Dept: LAB | Facility: CLINIC | Age: 27
End: 2022-12-01
Payer: COMMERCIAL

## 2022-12-01 DIAGNOSIS — B37.31 YEAST VAGINITIS: ICD-10-CM

## 2022-12-01 PROCEDURE — 87102 FUNGUS ISOLATION CULTURE: CPT

## 2022-12-05 LAB — BACTERIA SPEC CULT: NO GROWTH

## 2023-01-16 NOTE — TELEPHONE ENCOUNTER
Prior Authorization Retail Medication Request    Medication/Dose: fluconazole (DIFLUCAN) 150 MG tablet  ICD code (if different than what is on RX):  Recurrent candidiasis of vagina [B37.3  Previously Tried and Failed:  See chart  Rationale:  See chart     Insurance Name:  [employer]  Insurance ID:  [id number]       Pharmacy Information (if different than what is on RX)  Name:  [facility]  Phone:  [phone]     Other (Free Text): Patient states she had a her primary melanoma site on her right foot  excised in 1995. Patient states she noticed a few months ago a tender mass on her medial right thigh. Patient had an ultrasound guided tissue biopsy and was diagnosed with metastatic melanoma, with suspected primary from the foot. PET scan showed right inguinal nodes involved as well (see scanned imaging in attachments).  Patient states right now it can’t be operated on because her main vein is running through the mass. Patient is now on Opdivo and Yervoy to help shrink the mass to hopefully eventually excise the lesion, and she will follow-up with both medical and surgical oncology in April 2023 at [facility]. Other (Free Text): Patient states she had a her primary melanoma site on her right foot  excised in 1995. Patient states she noticed a few months ago a tender mass on her medial right thigh. Patient had an ultrasound guided tissue biopsy and was diagnosed with metastatic melanoma, with suspected primary from the foot. PET scan showed right inguinal nodes involved as well (see scanned imaging in attachments).  Patient states right now it can’t be operated on because her main vein is running through the mass. Patient is now on Opdivo and Yervoy to help shrink the mass to hopefully eventually excise the lesion, and she will follow-up with both medical and surgical oncology in April 2023 at [facility].

## 2023-03-03 ENCOUNTER — TELEPHONE (OUTPATIENT)
Dept: FAMILY MEDICINE | Facility: CLINIC | Age: 28
End: 2023-03-03

## 2023-03-03 NOTE — TELEPHONE ENCOUNTER
Prior Authorization Retail Medication Request    Medication/Dose: fluconazole (DIFLUCAN) 200 MG tablet  ICD code (if different than what is on RX):  Recurrent candidiasis of vagina [B37.31]   Previously Tried and Failed:  See chart  Rationale:  See chart    Insurance Name:  [employer]  Insurance ID:  [id number]      Pharmacy Information (if different than what is on RX)  Name:  [id number] - [place], [place] - [address]  Phone:  [phone]

## 2023-03-07 NOTE — TELEPHONE ENCOUNTER
Prior Authorization Not Needed per Insurance and Pharmacy They have a paid claim dated 02/27/2023    Medication: fluconazole (DIFLUCAN) 200 MG tablet PA NOT NEEDED   Insurance Company: [employer] - Phone [phone] Fax [phone]  Expected CoPay:      Pharmacy Filling the Rx: [id number] - [place], [place] - [address] AT [facility] [address]  Pharmacy Notified: Yes  Patient Notified: Yes

## 2023-04-11 DIAGNOSIS — F41.9 ANXIETY: ICD-10-CM

## 2023-04-11 NOTE — TELEPHONE ENCOUNTER
"Request for medication refill:  sertraline (ZOLOFT) 50 MG tablet    Providers if patient needs an appointment and you are willing to give a one month supply please refill for one month and  send a letter/MyChart using \".SMILLIMITEDREFILL\" .smillimited and route chart to \"[facility] \" (Giving one month refill in non controlled medications is strongly recommended before denial)    If refill has been denied, meaning absolutely no refills without visit, please complete the smart phrase \".smirxrefuse\" and route it to the \"[facility] MED REFILLS\"  pool to inform the patient and the pharmacy.    Joseph Bonner MA        "

## 2023-10-09 DIAGNOSIS — F41.1 GAD (GENERALIZED ANXIETY DISORDER): ICD-10-CM

## 2023-10-10 RX ORDER — PROPRANOLOL HYDROCHLORIDE 80 MG/1
80 CAPSULE, EXTENDED RELEASE ORAL DAILY
Qty: 90 CAPSULE | Refills: 0 | Status: SHIPPED | OUTPATIENT
Start: 2023-10-10 | End: 2024-01-08

## 2023-10-10 NOTE — TELEPHONE ENCOUNTER
"Request for medication refill:  propranolol ER (INDERAL LA) 80 MG 24 hr capsule     Providers if patient needs an appointment and you are willing to give a one month supply please refill for one month and  send a letter/MyChart using \".SMILLIMITEDREFILL\" .smillimited and route chart to \"P [facility] \" (Giving one month refill in non controlled medications is strongly recommended before denial)    If refill has been denied, meaning absolutely no refills without visit, please complete the smart phrase \".smirxrefuse\" and route it to the \"P [facility] MED REFILLS\"  pool to inform the patient and the pharmacy.    Anne-Marie Javier, CMA      "

## 2023-12-16 ENCOUNTER — HEALTH MAINTENANCE LETTER (OUTPATIENT)
Age: 28
End: 2023-12-16

## 2024-01-08 DIAGNOSIS — F41.1 GAD (GENERALIZED ANXIETY DISORDER): ICD-10-CM

## 2024-01-08 RX ORDER — PROPRANOLOL HYDROCHLORIDE 80 MG/1
80 CAPSULE, EXTENDED RELEASE ORAL DAILY
Qty: 90 CAPSULE | Refills: 0 | Status: SHIPPED | OUTPATIENT
Start: 2024-01-08 | End: 2024-03-22

## 2024-02-04 ENCOUNTER — OFFICE VISIT (OUTPATIENT)
Dept: FAMILY MEDICINE | Facility: CLINIC | Age: 29
End: 2024-02-04
Payer: COMMERCIAL

## 2024-02-04 VITALS
WEIGHT: 138.4 LBS | RESPIRATION RATE: 16 BRPM | HEART RATE: 67 BPM | SYSTOLIC BLOOD PRESSURE: 126 MMHG | OXYGEN SATURATION: 98 % | TEMPERATURE: 99.5 F | BODY MASS INDEX: 24.07 KG/M2 | DIASTOLIC BLOOD PRESSURE: 85 MMHG

## 2024-02-04 DIAGNOSIS — J01.90 ACUTE NON-RECURRENT SINUSITIS, UNSPECIFIED LOCATION: Primary | ICD-10-CM

## 2024-02-04 PROCEDURE — 99214 OFFICE O/P EST MOD 30 MIN: CPT | Performed by: NURSE PRACTITIONER

## 2024-02-04 NOTE — PROGRESS NOTES
"  Assessment & Plan     Acute non-recurrent sinusitis, unspecified location    - amoxicillin-clavulanate (AUGMENTIN) 875-125 MG tablet  Dispense: 12 tablet; Refill: 0    -Recurrent symptoms that appear to be improving, and then return since December.  Will treat since ongoing with antibiotic.  If symptoms do not improve within 1 to 2 weeks, she can follow-up with her PCP for further evaluation.   -Conservative management was discussed.  Warning signs were discussed.                   No follow-ups on file.    Eh Holbrook is a 28 year old, presenting for the following health issues:  Cough (Headache, Nasal Congestion, Phlegm. Symptoms have been present since December 21, 2023.)         No data to display              HPI     - started with Bronchitis she thinks in December. Had thick mucus, and chest congestion. Felt better, and then devloped ear congestion, muffled hearing, \"head blown up like a balloon\" with increased pressure sensation.  Head feels \"pretty snotty\". Today, mucus is thick, odorous, so much. Using  a tiffanie pot, sudafed, mucinex, and they are not helping.         Objective    /85   Pulse 67   Temp 99.5  F (37.5  C) (Tympanic)   Resp 16   Wt 62.8 kg (138 lb 6.4 oz)   SpO2 98%   BMI 24.07 kg/m    Body mass index is 24.07 kg/m .  Physical Exam   GENERAL: alert and no distress  EYES: Eyes grossly normal to inspection, PERRL and conjunctivae and sclerae normal  NECK: no adenopathy, no asymmetry, masses, or scars  RESP: lungs clear to auscultation - no rales, rhonchi or wheezes  CV: regular rate and rhythm, normal S1 S2, no S3 or S4, no murmur, click or rub, no peripheral edema  ABDOMEN: soft, nontender, no hepatosplenomegaly, no masses and bowel sounds normal  MS: no gross musculoskeletal defects noted, no edema    Lab on 12/01/2022   Component Date Value Ref Range Status    Culture 12/01/2022 No Growth   Final           Signed Electronically by: AMALIA Rosario CNP    "

## 2024-02-19 ENCOUNTER — OFFICE VISIT (OUTPATIENT)
Dept: FAMILY MEDICINE | Facility: CLINIC | Age: 29
End: 2024-02-19
Payer: COMMERCIAL

## 2024-02-19 VITALS
HEART RATE: 65 BPM | DIASTOLIC BLOOD PRESSURE: 80 MMHG | SYSTOLIC BLOOD PRESSURE: 112 MMHG | HEIGHT: 62 IN | RESPIRATION RATE: 12 BRPM | BODY MASS INDEX: 26.13 KG/M2 | OXYGEN SATURATION: 98 % | TEMPERATURE: 98.1 F | WEIGHT: 142 LBS

## 2024-02-19 DIAGNOSIS — J01.00 SUBACUTE MAXILLARY SINUSITIS: Primary | ICD-10-CM

## 2024-02-19 PROCEDURE — 99213 OFFICE O/P EST LOW 20 MIN: CPT | Performed by: FAMILY MEDICINE

## 2024-02-19 NOTE — PATIENT INSTRUCTIONS
Patient Education   Here is the plan from today's visit    1. Subacute maxillary sinusitis  Take this twice daily.  Stop after 2 days of being back to normal.   Continue the Netti pot twice a day  Restart the Flonase twice daily   - amoxicillin-clavulanate (AUGMENTIN) 875-125 MG tablet; Take 1 tablet by mouth 2 times daily  Dispense: 20 tablet; Refill: 0          Please call or return to clinic if your symptoms don't go away.    Follow up plan  No follow-ups on file.    Thank you for coming to [facility] today.  Lab Testing:  **If you had lab testing today and your results are reassuring or normal they will be mailed to you or sent through [employer] within 7 days.   **If the lab tests need quick action we will call you with the results.  **If you are having labs done on a different day, please call [phone] to schedule at [facility] or [phone] for other [facility] locations. Labs do not offer walk-in appointments.  The phone number we will call with results is # [phone] (home) . If this is not the best number please call our clinic and change the number.  Medication Refills:  If you need any refills please call your pharmacy and they will contact us.   If you need to  your refill at a new pharmacy, please contact the new pharmacy directly. The new pharmacy will help you get your medications transferred faster.   Scheduling:  If you have any concerns about today's visit or wish to schedule another appointment please call our office during normal business hours [phone] (8-5:00 M-F). If you can no longer make a scheduled visit, please cancel via [employer] or call us to cancel.   If a referral was made to an [facility] specialty provider and you do not get a call from central scheduling, please refer to directions on your visit summary or call our office during normal business hours for assistance.   If a Mammogram was ordered for you at the Breast Center call  [phone] to schedule or change your appointment.  If you had an XRay/CT/Ultrasound/MRI ordered the number is [phone] to schedule or change your radiology appointment.   [facility] has limited ultrasound appointments available on Wednesdays, if you would like your ultrasound at [facility], please call [phone] to schedule.   Medical Concerns:  If you have urgent medical concerns please call [phone] at any time of the day.    Vernoica Bill MD

## 2024-02-19 NOTE — PROGRESS NOTES
"  Assessment & Plan     Subacute maxillary sinusitis  Yusef was initially seen in urgent care on 2/6 for acute sinusitis and prescribed a 6 day course of Augmentin. Their symptoms improved greatly while taking Augmentin, but over the past 3 days they have felt nasal and head congestion returning. Because of their recurrent symptoms after initial improvement, this most likely represents incomplete infection control. There is less concern for bacterial resistance or full treatment failure at this time due to their significant improvement while on Augmentin. We discussed that they may just need a longer course of antibiotics to fully clear the infection, and that using flonase may help reduce inflammation and help clear congestion.  Oked them MyCharting me if not improving or not fully improved within the time course.   - amoxicillin-clavulanate (AUGMENTIN) 875-125 MG tablet  Dispense: 20 tablet; Refill: 0  - Continue Nettipot BID  - Restart Flonase BID              BMI  Estimated body mass index is 25.97 kg/m  as calculated from the following:    Height as of this encounter: 1.575 m (5' 2\").    Weight as of this encounter: 64.4 kg (142 lb).     Follow up: See Patient Instructions    Subjective   Yusef is a 28 year old, presenting for the following health issues:  Consult (Pt has a sinus infection since dec 21.pt went to urgent care 2/6 but it isnt better.)        2/19/2024     9:38 AM   Additional Questions   Roomed by roderick WRIGHT   Sinusitis:   - was seen in urgent care on 2/6, Augmentin helped a lot (2/6-2/13)  - but now sinus congestion may be coming back over past 3 days  - having mucus and foul smelling nasal discharge and head congestion  - nasal mucus was green, now past two days it is yellow   - more congestion on the right side   - no fevers/chills  - cough is improving but not completely gone   - has been using tiffanie pot and humidifier which helps some   - post nasal drip making throat sore  - has also had " "brain fog which is making her daily life more difficult  - Nettipot BID  - Flonase - not using.     Sinus history:  This is new for them. Only one in their life.   As a child had tubes for frequent AOM.         Objective    /80   Pulse 65   Temp 98.1  F (36.7  C)   Resp 12   Ht 1.575 m (5' 2\")   Wt 64.4 kg (142 lb)   SpO2 98%   BMI 25.97 kg/m    Body mass index is 25.97 kg/m .  Physical Exam  Constitutional:       General: Yusef Rodriguez is not in acute distress.     Appearance: Yusef Rodriguez is not ill-appearing or toxic-appearing.   HENT:      Head: Normocephalic and atraumatic.      Right Ear: Tympanic membrane normal.      Left Ear: Tympanic membrane normal.      Nose: Congestion present.      Comments: Yellow mucus present, nasal passage erythematous     Mouth/Throat:      Mouth: Mucous membranes are moist.      Comments: Post nasal drip present  Eyes:      Extraocular Movements: Extraocular movements intact.   Cardiovascular:      Rate and Rhythm: Normal rate and regular rhythm.      Heart sounds: Normal heart sounds.   Pulmonary:      Effort: Pulmonary effort is normal.      Breath sounds: Normal breath sounds.   Skin:     General: Skin is warm and dry.   Neurological:      General: No focal deficit present.      Mental Status: Yusef Rodriguez is alert and oriented to person, place, and time.   Psychiatric:         Mood and Affect: Mood normal.         Behavior: Behavior normal.         Thought Content: Thought content normal.         Judgment: Judgment normal.            Preceptor Attestation:   I was present with the medical student who participated in the service and in the documentation of this note. I have verified the history and personally performed the physical exam and medical decision making. I have verified the content of the note, which accurately reflects my assessment of the patient and the plan of care.   Supervising Physician:  Veronica Bill MD.   Angélica Gaytan, MS3  Medical Student    Signed " Electronically by: Veronica Bill MD

## 2024-03-22 ENCOUNTER — OFFICE VISIT (OUTPATIENT)
Dept: FAMILY MEDICINE | Facility: CLINIC | Age: 29
End: 2024-03-22
Payer: COMMERCIAL

## 2024-03-22 VITALS
OXYGEN SATURATION: 99 % | WEIGHT: 143 LBS | BODY MASS INDEX: 26.31 KG/M2 | DIASTOLIC BLOOD PRESSURE: 81 MMHG | HEIGHT: 62 IN | TEMPERATURE: 98.7 F | SYSTOLIC BLOOD PRESSURE: 122 MMHG | HEART RATE: 67 BPM

## 2024-03-22 DIAGNOSIS — Z00.00 ENCOUNTER FOR HEALTH MAINTENANCE EXAMINATION IN ADULT: Primary | ICD-10-CM

## 2024-03-22 DIAGNOSIS — F41.1 GAD (GENERALIZED ANXIETY DISORDER): ICD-10-CM

## 2024-03-22 DIAGNOSIS — J32.0 CHRONIC MAXILLARY SINUSITIS: ICD-10-CM

## 2024-03-22 PROCEDURE — 99395 PREV VISIT EST AGE 18-39: CPT | Performed by: STUDENT IN AN ORGANIZED HEALTH CARE EDUCATION/TRAINING PROGRAM

## 2024-03-22 PROCEDURE — 99214 OFFICE O/P EST MOD 30 MIN: CPT | Mod: 25 | Performed by: STUDENT IN AN ORGANIZED HEALTH CARE EDUCATION/TRAINING PROGRAM

## 2024-03-22 RX ORDER — PROPRANOLOL HYDROCHLORIDE 80 MG/1
80 CAPSULE, EXTENDED RELEASE ORAL DAILY
Qty: 90 CAPSULE | Refills: 0 | Status: SHIPPED | OUTPATIENT
Start: 2024-03-22 | End: 2024-07-10

## 2024-03-22 RX ORDER — LEVOFLOXACIN 750 MG/1
750 TABLET, FILM COATED ORAL DAILY
Qty: 10 TABLET | Refills: 0 | Status: SHIPPED | OUTPATIENT
Start: 2024-03-22 | End: 2024-04-01

## 2024-03-22 SDOH — HEALTH STABILITY: PHYSICAL HEALTH
ON AVERAGE, HOW MANY DAYS PER WEEK DO YOU ENGAGE IN MODERATE TO STRENUOUS EXERCISE (LIKE A BRISK WALK)?: PATIENT DECLINED

## 2024-03-22 ASSESSMENT — SOCIAL DETERMINANTS OF HEALTH (SDOH): HOW OFTEN DO YOU GET TOGETHER WITH FRIENDS OR RELATIVES?: PATIENT DECLINED

## 2024-03-22 NOTE — PATIENT INSTRUCTIONS
Dental Clinic Resource List  Drafted October 2022    Name/Address/Phone/Hours Hours & Good info to know   [facility] [place]  [address]  [place] [address]  Phone: [phone]  [email or web address]    Hours: Mon-Th: 730a-5p; Fri: 7a-4p.    *Offers Nitrous, oral and IV conscious sedation.    Serves Children & adults. *As of 10/2022 - not accepting new pediatric patients.    Income based dental plans available if no dental insurance.   Accepts MA & private insurance.   [facility] [place] [address] locations: [address] [place] [address] & [address]  Phone: [phone]  Fax: [phone]  [email or web address]    Hours: M: 830a-5p; Tu: 830a - 8p; W: 830a-7p; Th: 830a-5p; Fri: 830a-5p; Sat: 9a-1p; Closed Sun. *Offers Nitrous Oxide    Serves Children & Adults.     Adults need to have an exam first and then will be seen again after that.     Accepts MA & Sliding scale. If using sliding scale they request most recent income tax forms (1040 or 1040a) & Pay stubs x 2 months for all working members in household.   [address]  [place] [address]  Phone: [phone]  Fax: [phone]  [email or web address]    Hours: M-F: 730a - 330 pm. Closed Sat/Sun.  *Offers Nitrous Oxide    Serves Children & Adults    If uninsured - offers sliding scale based on income level & family size. Need to submit paycheck stubs & last year's income tax filing.   Accepts MA & private insurance.   [facility]  [address] [place] [address]  Phone: [phone]  Fax: [phone]  [email or web address]    Hours: M - Th: 815am - 5 pm; F: 815 am - 2pm. Closed Sat/Sun.  *Offers Nitrous Oxide    Serves Children & Adults    If uninsured - offers sliding scale based on income level & family size. Need to submit paycheck stubs & last year's income tax filing.   Accepts MA & private insurance.     [facility]  ([facility] - [place]  [address] [place] [address]  Phone (Dental): [phone]  Main: [phone]  [email or web address]    Hours: Mon-Fri: 8a-430 pm. Closed Sat/Sun.   *Offers Nitrous Oxide    Walk in Dental services available.  Sliding fee Discount Program available if uninsured.   Assistance on site to help apply for MNSure, Medical Assistance and dental coverage for children.   Accepts MA, private insurance & Medicare.    [facility] [place]  [address]  [place] [address]  Phone: [phone]    [email or web address]    Hours: Mon-Fri: 730am - 430 pm. Closed Sat/Sun. *Offers Nitrous Oxide    Serves Children & Adults  *Limited availability for accepting new patients - mainly only accepting pediatrics as of 10/2022.    Accepts insured, uninsured and underinsured patients.    [facility]  [address]  [address]  [place] [address]  Phone: [phone]    [email or web address]    Hours: Mon - Thu: 8am - 9 pm, Fri: 8am 0- 4 pm. Closed Sat/Sun.   *Unknown if it offers nitrous oxide.    Will serve anyone who does not have dental insurance. All dental treatment is free. They request a $20 fee per visit for administrative costs.  No walk-in appointments.   [facility]  [address] [place] [address]    [email or web address]    Hours: Mon: 830am- 5pm; T: 930 am-5pm; Wed-Fri: 830 am -5 pm. Closed Sat/Sun. *May offer Nitrous Oxide in certain circumstances.               [facility] [place]  [address] [place] [address]  Main: [phone]; Fax: [phone]  *Located in [facility] - Parking is Free in [address] off of [address]. Enter first floor entrance of the [facility] (Door [facility]). Take the elevator to the  2nd floor and follow signs to the dental clinic.     [email or web address]    Hours: Hours vary by semester. Mid September- Mid December: Open Mondays, Tuesdays and Wednesdays. Late January-Early May: Open Mondays, Tuesday, Wednesdays, Thursdays. Fridays (Beginning early February)           *Offers Nitrous Oxide at no additional cost.    No insurance accepted. Cash/Visa/Mastercard/Discover/Check   [facility] (Dental) - [place]  [address] [place] [address]  Appt Line: [phone]    [email or web address]    Hours: Mon-F: 830 am - 5 pm. *As of 10/2022 - not currently accepting new dental patients.    Serves All ages.    Accepts most insurance and helps with benefits enrollment  Offers income-based discounts.  Helps arrange payment plans.   [facility]  Only at [place] - [address] [place] [address]  Main Line: [phone]    [email or web address]    Hours: M/W/Th/F: 8am - 5pm. No dental services Tuesday. Closed weekends.   *No dental walk-ins.    Sliding fee options available.          [facility] [place]  [address] [place] [address]  Phone: [phone]    Hours: M-F: 8am - 430 pm         *Does not offer nitrous oxide.    Accepts limited walk-in appointments.   [employer] [address]  Free parking behind the building for patients.    [facility] [place]  [address] [place] [address]  Dental line: [phone]  Fax: [phone]    [email or web address]    Hours: M-F: 7am -  5pm   *Does not offer Nitrous Oxide    Accepts public and private insurance.   Offers many dental services on a sliding fee scale.   [facility]  [address] [place] [address]  Phone: [phone]  Scheduling line  available 7am - 7pm.  Fax: [phone]    [email or web address]    Hours: M-F: 8am - 5pm. Tuesday & Wed evenings until 8 pm.  *Offers nitrous oxide for dental visits    Serves all ages.    Sliding fee discount program based on household income.           Preventive Care Advice   This is general advice given by our system to help you stay healthy. However, your care team may have specific advice just for you. Please talk to your care team about your preventive care needs.  Nutrition  Eat 5 or more servings of fruits and vegetables each day.  Try wheat bread, brown rice and whole grain pasta (instead of white bread, rice, and pasta).  Get enough calcium and vitamin D. Check the label on foods and aim for 100% of the RDA (recommended daily allowance).  Lifestyle  Exercise at least 150 minutes each week   (30 minutes a day, 5 days a week).  Do muscle strengthening activities 2 days a week. These help control your weight and prevent disease.  No smoking.  Wear sunscreen to prevent skin cancer.  Have a dental exam and cleaning every 6 months.  Yearly exams  See your health care team every year to talk about:  Any changes in your health.  Any medicines your care team has prescribed.  Preventive care, family planning, and ways to prevent chronic diseases.  Shots (vaccines)   HPV shots (up to age 26), if you've never had them before.  Hepatitis B shots (up to age 59), if you've never had them before.  COVID-19 shot: Get this shot when it's due.  Flu shot: Get a flu shot every year.  Tetanus shot: Get a tetanus shot every 10 years.  Pneumococcal, hepatitis A, and RSV shots: Ask your care team if you need these based on your risk.  Shingles shot (for age 50 and up).  General health tests  Diabetes screening:  Starting at age 35, Get screened for diabetes at least every 3 years.  If you are younger than age 35, ask your care team if you should be screened for diabetes.  Cholesterol test: At age 39, start having a  cholesterol test every 5 years, or more often if advised.  Bone density scan (DEXA): At age 50, ask your care team if you should have this scan for osteoporosis (brittle bones).  Hepatitis C: Get tested at least once in your life.  STIs (sexually transmitted infections)  Before age 24: Ask your care team if you should be screened for STIs.  After age 24: Get screened for STIs if you're at risk. You are at risk for STIs (including HIV) if:  You are sexually active with more than one person.  You don't use condoms every time.  You or a partner was diagnosed with a sexually transmitted infection.  If you are at risk for HIV, ask about PrEP medicine to prevent HIV.  Get tested for HIV at least once in your life, whether you are at risk for HIV or not.  Cancer screening tests  Cervical cancer screening: If you have a cervix, begin getting regular cervical cancer screening tests at age 21. Most people who have regular screenings with normal results can stop after age 65. Talk about this with your provider.  Breast cancer scan (mammogram): If you've ever had breasts, begin having regular mammograms starting at age 40. This is a scan to check for breast cancer.  Colon cancer screening: It is important to start screening for colon cancer at age 45.  Have a colonoscopy test every 10 years (or more often if you're at risk) Or, ask your provider about stool tests like a FIT test every year or Cologuard test every 3 years.  To learn more about your testing options, visit: [email or web address].  For help making a decision, visit: https://bit.ly/aq37043.  Prostate cancer screening test: If you have a prostate and are age 55 to 69, ask your provider if you would benefit from a yearly prostate cancer screening test.  Lung cancer screening: If you are a current or former smoker age 50 to 80, ask your care team if ongoing lung cancer screenings are right for you.  For informational purposes only. Not to replace the  advice of your health care provider. Copyright   2023 [facility]. All rights reserved. Clinically reviewed by the [facility] Transitions Program. [employer] [id number] - REV 01/24.

## 2024-03-22 NOTE — PROGRESS NOTES
"Preventive Care Visit  [facility]  Sorayahugo Sandhu DO, Family Medicine  Mar 22, 2024      Assessment & Plan     Encounter for health maintenance examination in adult  Preventive recommendations reviewed.  Declines vaccines.  Will follow-up for Pap when due this spring/summer    WILNER (generalized anxiety disorder)  Adequate control on propranolol  - propranolol ER (INDERAL LA) 80 MG 24 hr capsule  Dispense: 90 capsule; Refill: 0    Chronic maxillary sinusitis  Severe, recurrent symptoms now persisting for greater than 10 days again.  Was previously treated by Augmentin-reviewed past records and looks like it was a prolonged course but has only tried 1 antibiotic.  Will trial antibiotic treatment from a different class and refer to ENT for further evaluation.  - Adult ENT  Referral  - levofloxacin (LEVAQUIN) 750 MG tablet  Dispense: 10 tablet; Refill: 0    History of recurrent vulvar yeast   Resolved after prolonged fluconazole therapy and switching some personal products.  She did review possibility of something like Sjogren's gave in issues at multiple mucosal surfaces, vulvovaginal as well as dry eyes and some mouth issues. Will hold off on any lab work at this time but could consider workup in the future if recurrent or worsening issues.       BMI  Estimated body mass index is 26.16 kg/m  as calculated from the following:    Height as of this encounter: 1.575 m (5' 2\").    Weight as of this encounter: 64.9 kg (143 lb).   Weight management plan: Discussed healthy diet and exercise guidelines    Counseling  Appropriate preventive services were discussed with this patient, including applicable screening as appropriate for fall prevention, nutrition, physical activity, Tobacco-use cessation, weight loss and cognition.  Checklist reviewing preventive services available has been given to the patient.  Reviewed patient's diet, addressing concerns and/or questions.       Return in about 3 " "months (around 6/22/2024) for Follow up, with me for routine pap smear .    Subjective   Yusef is a 28 year old, presenting for the following:  Physical (Annual wellness exam )        3/22/2024     4:01 PM   Additional Questions   Roomed by Ghazala         3/22/2024    Information    services provided? No        HPI  Recurrent sinusitis: UC Augmentin x6d starting 2/6, improved then recurred. Then saw Fly 2/19 got Augmentin x10d and resolved.     Sx returend ramping up since about the 11th.   Symptoms are changing daily. Yestrday was really snotty, had some drainage. Coughing up \"plaques of booger chunks\" from lungs from post nasal drip.     Neti pot, trying to do double salinity  Flonase BID   Started claritin     Stopped the fluconazole. Thinks they forgot to get their last refill. A quarter of the year short. Seem to be stable. Not really very sexually active bc a little traumatized by the whole situation. Did find a personal lubricant that has hyaluronic acid that seems to help retain moisture  - Sjogren's? As a kid they had dry mouthh, dry eyes all the time. Vagina seems chronically dry. Eyes do still give them trouble. Used to have to wake up w/ eye closed aspash water, then got an ointment. Gets blocked salivary glands. Had one surgically removed bc itw as so blocked - a teenager.     Fam hx: mom has psoriasis, endometriosis s/p hyst and oophorectomy  Older ssiter w/ endo     Has happened twice   Sweat bubble bumps on palms   Really thin skin overlying   Could pop them   Gets other weird rashes - had a weird rxn up their arm 2-3x   Allergic to grass     Rash itchy patches   Ringworm topical -- thinks it was pityriasis rosea         3/22/2024   General Health   How would you rate your overall physical health? (!) FAIR   Feel stress (tense, anxious, or unable to sleep) Patient declined         3/22/2024   Nutrition   Three or more servings of calcium each day? (!) I DON'T KNOW   Diet: Other "   If other, please elaborate: Lactose intolerance   How many servings of fruit and vegetables per day? (!) I DON'T KNOW   How many sweetened beverages each day? 0-1         3/22/2024   Exercise   Days per week of moderate/strenous exercise Patient declined         3/22/2024   Social Factors   Frequency of gathering with friends or relatives Patient declined   Worry food won't last until get money to buy more No   Food not last or not have enough money for food? No   Do you have housing?  Yes   Are you worried about losing your housing? No   Lack of transportation? No   Unable to get utilities (heat,electricity)? No         3/22/2024   Dental   Dentist two times every year? (!) DECLINE         3/22/2024   TB Screening   Were you born outside of the [place]? No           Today's PHQ-2 Score:       2/19/2024     9:39 AM   PHQ-2 ( 1999 [employer])   Q1: Little interest or pleasure in doing things 0   Q2: Feeling down, depressed or hopeless 0   PHQ-2 Score 0         3/22/2024   Substance Use   Alcohol more than 3/day or more than 7/wk No   Do you use any other substances recreationally? No     Social History     Tobacco Use    Smoking status: Former     Packs/day: 0.00     Years: 0.00     Additional pack years: 0.00     Total pack years: 0.00     Types: Cigarettes    Smokeless tobacco: Never   Vaping Use    Vaping Use: Former    Substances: Nicotine, Flavoring    Devices: Refillable tank   Substance Use Topics    Alcohol use: Yes     Comment: 2-3 drinks a week     Drug use: No          Mammogram Screening - Patient under 40 years of age: Routine Mammogram Screening not recommended.         3/22/2024   STI Screening   New sexual partner(s) since last STI/HIV test? No     History of abnormal Pap smear: NO - age 30-65 PAP every 5 years with negative HPV co-testing recommended        5/20/2021    12:45 PM 11/26/2018    11:58 AM 9/3/2015    12:00 AM   PAP / HPV   PAP (Historical) NIL  NIL     PAP-ABSTRACT   See Scanned Document         "   This result is from an external source.           3/22/2024   Contraception/Family Planning   Questions about contraception or family planning No        Reviewed and updated as needed this visit by Provider   Tobacco  Allergies  Meds  Problems  Med Hx  Surg Hx  Fam Hx              Review of Systems  Constitutional, HEENT, cardiovascular, pulmonary, gi and gu systems are negative, except as otherwise noted.     Objective    Exam  /81 (BP Location: Left arm, Patient Position: Sitting, Cuff Size: Adult Regular)   Pulse 67   Temp 98.7  F (37.1  C) (Oral)   Ht 1.575 m (5' 2\")   Wt 64.9 kg (143 lb)   SpO2 99%   BMI 26.16 kg/m     Estimated body mass index is 26.16 kg/m  as calculated from the following:    Height as of this encounter: 1.575 m (5' 2\").    Weight as of this encounter: 64.9 kg (143 lb).    Physical Exam  GENERAL: alert and no distress, appears mildly uncomfortable   EYES: Eyes grossly normal to inspection, PERRL and conjunctivae and sclerae normal  HENT: bilateral serous effusion. Nasal mucosa edematous, erythematous. Mild bilateral maxillary sinus tenderness. Sounds congested.   NECK: no adenopathy, no asymmetry, masses, or scars  RESP: lungs clear to auscultation - no rales, rhonchi or wheezes  CV: regular rate and rhythm, normal S1 S2, no S3 or S4, no murmur, click or rub, no peripheral edema  MS: no gross musculoskeletal defects noted, no edema        Signed Electronically by: Soraya Sandhu DO    "

## 2024-03-27 NOTE — TELEPHONE ENCOUNTER
"FUTURE VISIT INFORMATION      FUTURE VISIT INFORMATION:  Date: 7/5/24  Time: 11 AM  Location: [facility]  REFERRAL INFORMATION:  Referring provider:  Soraya Sandhu DO   Referring providers clinic:  [facility]  Reason for visit/diagnosis:  per pt, refd Soraya Sandhu, Chronic maxillary sinusitis, [facility] confd     RECORDS REQUESTED FROM      Clinic name Comments Records Status Imaging Status   [facility] 3/22/24 referral/ OV with Soraya Sandhu DO     2/19/24 + 2/4/24 OV notes Epic            \"Please notify/message CSS if patient completed outside imaging prior to scheduled appointment and/or any outside records that might have been missed at pre visit -Thank you\"  "

## 2024-04-12 ENCOUNTER — OFFICE VISIT (OUTPATIENT)
Dept: FAMILY MEDICINE | Facility: CLINIC | Age: 29
End: 2024-04-12
Payer: COMMERCIAL

## 2024-04-12 VITALS
BODY MASS INDEX: 26.31 KG/M2 | DIASTOLIC BLOOD PRESSURE: 87 MMHG | SYSTOLIC BLOOD PRESSURE: 121 MMHG | WEIGHT: 143 LBS | HEIGHT: 62 IN | HEART RATE: 75 BPM | TEMPERATURE: 97.6 F | RESPIRATION RATE: 14 BRPM | OXYGEN SATURATION: 98 %

## 2024-04-12 DIAGNOSIS — Z12.4 CERVICAL CANCER SCREENING: Primary | ICD-10-CM

## 2024-04-12 DIAGNOSIS — L29.2 VULVAR ITCHING: ICD-10-CM

## 2024-04-12 PROCEDURE — 87624 HPV HI-RISK TYP POOLED RSLT: CPT | Performed by: STUDENT IN AN ORGANIZED HEALTH CARE EDUCATION/TRAINING PROGRAM

## 2024-04-12 PROCEDURE — 88175 CYTOPATH C/V AUTO FLUID REDO: CPT | Performed by: STUDENT IN AN ORGANIZED HEALTH CARE EDUCATION/TRAINING PROGRAM

## 2024-04-12 PROCEDURE — 99213 OFFICE O/P EST LOW 20 MIN: CPT | Performed by: STUDENT IN AN ORGANIZED HEALTH CARE EDUCATION/TRAINING PROGRAM

## 2024-04-12 RX ORDER — ESTRADIOL 0.1 MG/G
2 CREAM VAGINAL
Qty: 42.5 G | Refills: 2 | Status: SHIPPED | OUTPATIENT
Start: 2024-04-15 | End: 2024-07-02

## 2024-04-12 NOTE — PROGRESS NOTES
"  Assessment & Plan     Cervical cancer screening  - Pap Screen with HPV - recommended age 30 - 65 years  - HPV Hold (Lab Only)  - HPV High Risk Types DNA Cervical    Vulvar itching  Longstanding itching and sensitivity. Improved s/p chronic yeast treatment but still some lingering sx. Discussed possibility of low estrogenn in setting of LNG IUD - will trial topical.   - estradiol (ESTRACE) 0.1 MG/GM vaginal cream  Dispense: 42.5 g; Refill: 2    No follow-ups on file.    Subjective   Yusef is a 28 year old, presenting for the following health issues:  Gyn Exam    HPI     Symptoms doing better   Found a lube that works         Objective    /87   Pulse 75   Temp 97.6  F (36.4  C)   Resp 14   Ht 1.575 m (5' 2\")   Wt 64.9 kg (143 lb)   SpO2 98%   BMI 26.16 kg/m    Body mass index is 26.16 kg/m .  Physical Exam   GENERAL: alert, cooperative, in no acute distress  HEENT: sclera clear  PULM: normal respiratory effort   : normal external genitalia without lesion. Erythematous border around labia majora w/o lesion or discharge. Vaginal mucosa pink and well rugated. Scant physiologic-appearing discharge in vault. Cervix visualized and normal in appearance. IUD strings visualized.   NEURO: alert and oriented, grossly intact, moves all extremities, normal gait   SKIN: no rashes or lesions visualized   PSYCH: euthymic affect         Signed Electronically by: Soraya Sandhu DO    "

## 2024-04-16 LAB
BKR LAB AP GYN ADEQUACY: NORMAL
BKR LAB AP GYN INTERPRETATION: NORMAL
BKR LAB AP HPV REFLEX: NORMAL
BKR LAB AP PREVIOUS ABNORMAL: NORMAL
PATH REPORT.COMMENTS IMP SPEC: NORMAL
PATH REPORT.COMMENTS IMP SPEC: NORMAL
PATH REPORT.RELEVANT HX SPEC: NORMAL

## 2024-04-17 LAB
HUMAN PAPILLOMA VIRUS 16 DNA: NEGATIVE
HUMAN PAPILLOMA VIRUS 18 DNA: NEGATIVE
HUMAN PAPILLOMA VIRUS FINAL DIAGNOSIS: NORMAL
HUMAN PAPILLOMA VIRUS OTHER HR: NEGATIVE

## 2024-05-06 ENCOUNTER — TELEPHONE (OUTPATIENT)
Dept: OTOLARYNGOLOGY | Facility: CLINIC | Age: 29
End: 2024-05-06
Payer: COMMERCIAL

## 2024-05-06 NOTE — TELEPHONE ENCOUNTER
1st attempt for the patient to call back and schedule the following: - VM box full    Appointment type: [facility]  Provider: Citlalli  Return date: next available   Specialty phone number: [phone]  Additional appointment(s) needed:   Additional Notes: Provider not available on 7/5

## 2024-05-09 ENCOUNTER — TELEPHONE (OUTPATIENT)
Dept: OTOLARYNGOLOGY | Facility: CLINIC | Age: 29
End: 2024-05-09
Payer: COMMERCIAL

## 2024-05-09 NOTE — TELEPHONE ENCOUNTER
Left Voicemail (2nd Attempt) for the patient to call back and schedule the following:    Appointment type: [facility]  Provider: Citlalli  Return date: next available   Specialty phone number: [phone]  Additional appointment(s) needed:   Additonal Notes: Provider not available on 7/5

## 2024-07-02 ENCOUNTER — MYC MEDICAL ADVICE (OUTPATIENT)
Dept: FAMILY MEDICINE | Facility: CLINIC | Age: 29
End: 2024-07-02
Payer: COMMERCIAL

## 2024-07-02 DIAGNOSIS — L29.2 VULVAR ITCHING: ICD-10-CM

## 2024-07-03 RX ORDER — ESTRADIOL 0.1 MG/G
1 CREAM VAGINAL
Qty: 42.5 G | Refills: 2 | Status: SHIPPED | OUTPATIENT
Start: 2024-07-04

## 2024-07-03 NOTE — TELEPHONE ENCOUNTER
"Pt sent MyChart requesting refill sent to different pharmacy. RN queued up script with requested pharmacy sending to provider    Last seen 4/12/24    Request for medication refill:    estradiol (ESTRACE) 0.1 MG/GM vaginal cream     Providers if patient needs an appointment and you are willing to give a one month supply please refill for one month and  send a letter/MyChart using \".SMILLIMITEDREFILL\" .smillimited and route chart to \"P SMI \" (Giving one month refill in non controlled medications is strongly recommended before denial)    If refill has been denied, meaning absolutely no refills without visit, please complete the smart phrase \".smirxrefuse\" and route it to the \"P SMI MED REFILLS\"  pool to inform the patient and the pharmacy.    Shama Hernandez, RN    "

## 2024-07-05 ENCOUNTER — PRE VISIT (OUTPATIENT)
Dept: OTOLARYNGOLOGY | Facility: CLINIC | Age: 29
End: 2024-07-05

## 2024-07-10 DIAGNOSIS — F41.1 GAD (GENERALIZED ANXIETY DISORDER): ICD-10-CM

## 2024-07-10 DIAGNOSIS — F41.9 ANXIETY: ICD-10-CM

## 2024-07-10 RX ORDER — PROPRANOLOL HYDROCHLORIDE 80 MG/1
80 CAPSULE, EXTENDED RELEASE ORAL DAILY
Qty: 90 CAPSULE | Refills: 3 | Status: SHIPPED | OUTPATIENT
Start: 2024-07-10

## 2024-07-10 NOTE — TELEPHONE ENCOUNTER
"Request for medication refill:  propranolol ER (INDERAL LA) 80 MG 24 hr capsule     sertraline (ZOLOFT) 50 MG tablet     Providers if patient needs an appointment and you are willing to give a one month supply please refill for one month and  send a letter/MyChart using \".SMILLIMITEDREFILL\" .smillimited and route chart to \"P SMI \" (Giving one month refill in non controlled medications is strongly recommended before denial)    If refill has been denied, meaning absolutely no refills without visit, please complete the smart phrase \".smirxrefuse\" and route it to the \"P SMI MED REFILLS\"  pool to inform the patient and the pharmacy.    Anne-Marie Javier, CMA      "

## 2024-08-22 NOTE — TELEPHONE ENCOUNTER
FUTURE VISIT INFORMATION      FUTURE VISIT INFORMATION:  Date: 9/17/2024  Time: 2:40 PM  Location: [facility]  REFERRAL INFORMATION:  Referring provider: Dr. Soraya Sandhu  Referring providers clinic: [facility]  Reason for visit/diagnosis: Chronic maxillary sinusitis     RECORDS REQUESTED FROM:       Clinic name Comments Records Status Imaging Status   [facility] 4/12/24, 3/22/24 - [facility] OV with Dr. Sandhu  2/19/24 - [facility] OV with Dr. Fly Bermudez 2/4/24 - [facility] OV with MORENA Jones

## 2024-09-16 PROBLEM — J32.0 CHRONIC MAXILLARY SINUSITIS: Status: ACTIVE | Noted: 2024-09-16

## 2024-09-17 ENCOUNTER — OFFICE VISIT (OUTPATIENT)
Dept: OTOLARYNGOLOGY | Facility: CLINIC | Age: 29
End: 2024-09-17
Payer: COMMERCIAL

## 2024-09-17 ENCOUNTER — PRE VISIT (OUTPATIENT)
Dept: OTOLARYNGOLOGY | Facility: CLINIC | Age: 29
End: 2024-09-17

## 2024-09-17 DIAGNOSIS — R09.82 POST-NASAL DRIP: ICD-10-CM

## 2024-09-17 DIAGNOSIS — J34.2 DEVIATED NASAL SEPTUM: Primary | ICD-10-CM

## 2024-09-17 PROCEDURE — 31231 NASAL ENDOSCOPY DX: CPT | Performed by: STUDENT IN AN ORGANIZED HEALTH CARE EDUCATION/TRAINING PROGRAM

## 2024-09-17 PROCEDURE — 99203 OFFICE O/P NEW LOW 30 MIN: CPT | Mod: 25 | Performed by: STUDENT IN AN ORGANIZED HEALTH CARE EDUCATION/TRAINING PROGRAM

## 2024-09-17 ASSESSMENT — PAIN SCALES - GENERAL: PAINLEVEL: NO PAIN (0)

## 2024-09-17 NOTE — PATIENT INSTRUCTIONS
You were seen in the ENT Clinic today by Dr. Lennon. If you have any questions or concerns after your appointment, please contact us (see below)    The following has been recommended for you based upon your appointment today:  CT sinus    How to Contact Us:  Send a AudioEye message to your provider. Our team will respond to you via AudioEye. Occasionally, we will need to call you to get further information.  For urgent matters (Monday-Friday), call the ENT Clinic: [phone] and speak with a call center team member - they will route your call appropriately.   If you'd like to speak directly with a nurse, please find our contact information below. We do our best to check voicemail frequently throughout the day, and will work to call you back within 1-2 days. For urgent matters, please use the general clinic phone numbers listed above.    Farrah GARNER RN, BSN   RN Care Coordinator, ENT Clinic  [facility]  Direct: [phone]  Merly MALDONADO LPN  Direct: [phone]

## 2024-09-17 NOTE — PROGRESS NOTES
[facility] - Rhinology  New Patient Visit      Encounter date:   September 17, 2024    Referring Provider:  Referred Self, MD  No address on file    Assessment, Decision Making, and Plan:  (J34.2) Deviated nasal septum  (primary encounter diagnosis)  (R09.82) Post-nasal drip  Comment:   --DNS present but not significantly obstructing LNW so unlikely to be a major contributor  --by history suspect URI with subsequent acute sinusitis with prolonged recovery  --endoscopic exam with double MT on the left but no active infection or drainage; NP clear  Plan:   --given duration of symptoms as well as multiple intermediate rounds of antibiotics, steroids, rinses recommend a CT scan to rule out deeper persistent sinus disease  --next steps pending imaging results    Chief Complaint: PND    History of Present Illness:   Yady Rodriguez is a 28 year old adult who presents for consultation regarding PND.    URI December  Prolonged symptoms through May  Multiple rounds of antibiotics (Augmentin x 6 days Feb; Augmentin x 10 days Feb; March course of levofloxacin)  Has been using rinses  Used claritin  Went to [place] yearly - has worsening of allergic symptoms which is distinct   Did flonase - > 1 month use on a daily basis, has since stopped when symptoms started improving    Lingering symptoms at this point include post nasal drip  More difficult to breathe through the right side, but has a DNS (back to baseline breathing)  No thick cloudy drainage out of the front of the nose  Sense of smell & taste were unaffected    Never had issues with recurrent sinus infections in the past  +RAOM + tubes  No sinonasal surgery or facial trauma    Review of systems: A 14-point review of systems has been conducted and was negative for any notable symptoms, except as dictated in the history of present illness.     Medical History:  Past Medical History:   Diagnosis Date    Depressive disorder 2010    Hypertension 12/01/2017         Surgical History:   Past Surgical History:   Procedure Laterality Date    DENTAL SURGERY      SALIVARY GLAND SURGERY  2009        Family History:  Family History   Problem Relation Age of Onset    Hypertension Mother         155/90    Hyperlipidemia Mother         Over 300    Depression Mother     Psoriasis Mother     Endometriosis Mother     Diabetes Father     Valvular heart disease Father         bicuspid aortic valve    Diabetes Type 2  Father     Endometriosis Sister     Diabetes Paternal Uncle     Breast Cancer No family hx of     Colon Cancer No family hx of         Social History:   Social History     Socioeconomic History    Marital status: Single   Tobacco Use    Smoking status: Former     Current packs/day: 0.00     Types: Cigarettes    Smokeless tobacco: Never   Vaping Use    Vaping status: Former    Substances: Nicotine, Flavoring    Devices: Refillable tank   Substance and Sexual Activity    Alcohol use: Yes     Comment: 2-3 drinks a week     Drug use: No    Sexual activity: Not Currently     Partners: Male     Birth control/protection: I.U.D.   Other Topics Concern    Parent/sibling w/ CABG, MI or angioplasty before 65F 55M? No     Social Determinants of Health     Financial Resource Strain: Low Risk  (3/22/2024)    Financial Resource Strain     Within the past 12 months, have you or your family members you live with been unable to get utilities (heat, electricity) when it was really needed?: No   Food Insecurity: Low Risk  (3/22/2024)    Food Insecurity     Within the past 12 months, did you worry that your food would run out before you got money to buy more?: No     Within the past 12 months, did the food you bought just not last and you didn t have money to get more?: No   Transportation Needs: Low Risk  (3/22/2024)    Transportation Needs     Within the past 12 months, has lack of transportation kept you from medical appointments, getting your medicines, non-medical meetings or appointments,  work, or from getting things that you need?: No   Physical Activity: Unknown (3/22/2024)    Exercise Vital Sign     Days of Exercise per Week: Patient declined   Stress: Patient Declined (3/22/2024)    [employer] [place] - Occupational Stress Questionnaire     Feeling of Stress : Patient declined   Social Connections: Unknown (3/22/2024)    Social Connection and Isolation Panel [NHANES]     Frequency of Social Gatherings with Friends and Family: Patient declined   Interpersonal Safety: Low Risk  (2/19/2024)    Interpersonal Safety     Do you feel physically and emotionally safe where you currently live?: Yes     Within the past 12 months, have you been hit, slapped, kicked or otherwise physically hurt by someone?: No     Within the past 12 months, have you been humiliated or emotionally abused in other ways by your partner or ex-partner?: No   Housing Stability: Low Risk  (3/22/2024)    Housing Stability     Do you have housing? : Yes     Are you worried about losing your housing?: No        Physical Exam:  Vital signs: There were no vitals taken for this visit.   General Appearance: No acute distress, appropriate demeanor, conversant  Eyes: moist conjunctivae; EOMI; pupils symmetric; visual acuity grossly intact; no proptosis  Head: normocephalic; overall symmetric appearance without deformity  Face: overall symmetric without deformity  Ears: Normal appearance of external ear; external meatus normal in appearance; TMs intact without perforation bilaterally;   Nose: No external deformity; septum (DNS L) ; inferior turbinates (non hypertrophic)   Oral Cavity/oropharynx: Normal appearance of mucosa; tongue midline; no mass or lesions; oropharynx without obvious mucosal abnormality  Neck: no palpable lymphadenopathy; thyroid without palpable nodules  Lungs: symmetric chest rise; no wheezing  CV: Good distal perfusion; normal hear rate  Extremities: No deformity  Neurologic Exam: Cranial nerves  II-XII are grossly intact; no focal deficit    Procedure Note  Procedure performed: Rigid nasal endoscopy  Indication: To evaluate for sinonasal pathology not visualized on routine anterior rhinoscopy  Anesthesia: 4% topical lidocaine with 0.05% oxymetazoline  Description of procedure: A 30 degree, 3 mm rigid endoscope was inserted into bilateral nasal cavities and the nasal valves, nasal cavity, middle meatus, sphenoethmoid recess, and nasopharynx were thoroughly evaluated for evidence of obstruction, edema, purulence, polyps and/or mass/lesion.     Findings:    Double MT on the left, MM SER NP clear    The patient tolerated the procedure well without complication.     Yohannes Lennon MD    [facility]  Rhinology  Endoscopic Skull Base Surgery  [facility]  Department of Otolaryngology - Head & Neck Surgery

## 2024-09-17 NOTE — LETTER
9/17/2024       RE: Yady Rodriguez  [address]  [place] [address]     Dear Colleague,    Thank you for referring your patient, Yady Rodriguez, to the [facility] [place] [facility]. Please see a copy of my visit note below.      [facility] [place] - Rhinology  New Patient Visit      Encounter date:   September 17, 2024    Referring Provider:  Referred Self, MD  No address on file    Assessment, Decision Making, and Plan:  (J34.2) Deviated nasal septum  (primary encounter diagnosis)  (R09.82) Post-nasal drip  Comment:   --DNS present but not significantly obstructing LNW so unlikely to be a major contributor  --by history suspect URI with subsequent acute sinusitis with prolonged recovery  --endoscopic exam with double MT on the left but no active infection or drainage; NP clear  Plan:   --given duration of symptoms as well as multiple intermediate rounds of antibiotics, steroids, rinses recommend a CT scan to rule out deeper persistent sinus disease  --next steps pending imaging results    Chief Complaint: PND    History of Present Illness:   Yady Rodriguez is a 28 year old adult who presents for consultation regarding PND.    URI December  Prolonged symptoms through May  Multiple rounds of antibiotics (Augmentin x 6 days Feb; Augmentin x 10 days Feb; March course of levofloxacin)  Has been using rinses  Used claritin  Went to [place] yearly - has worsening of allergic symptoms which is distinct   Did flonase - > 1 month use on a daily basis, has since stopped when symptoms started improving    Lingering symptoms at this point include post nasal drip  More difficult to breathe through the right side, but has a DNS (back to baseline breathing)  No thick cloudy drainage out of the front of the nose  Sense of smell & taste were unaffected    Never had issues with recurrent sinus infections in the past  +RAOM + tubes  No  sinonasal surgery or facial trauma    Review of systems: A 14-point review of systems has been conducted and was negative for any notable symptoms, except as dictated in the history of present illness.     Medical History:  Past Medical History:   Diagnosis Date     Depressive disorder 2010     Hypertension 12/01/2017        Surgical History:   Past Surgical History:   Procedure Laterality Date     DENTAL SURGERY       SALIVARY GLAND SURGERY  2009        Family History:  Family History   Problem Relation Age of Onset     Hypertension Mother         155/90     Hyperlipidemia Mother         Over 300     Depression Mother      Psoriasis Mother      Endometriosis Mother      Diabetes Father      Valvular heart disease Father         bicuspid aortic valve     Diabetes Type 2  Father      Endometriosis Sister      Diabetes Paternal Uncle      Breast Cancer No family hx of      Colon Cancer No family hx of         Social History:   Social History     Socioeconomic History     Marital status: Single   Tobacco Use     Smoking status: Former     Current packs/day: 0.00     Types: Cigarettes     Smokeless tobacco: Never   Vaping Use     Vaping status: Former     Substances: Nicotine, Flavoring     Devices: Refillable tank   Substance and Sexual Activity     Alcohol use: Yes     Comment: 2-3 drinks a week      Drug use: No     Sexual activity: Not Currently     Partners: Male     Birth control/protection: I.U.D.   Other Topics Concern     Parent/sibling w/ CABG, MI or angioplasty before 65F 55M? No     Social Determinants of Health     Financial Resource Strain: Low Risk  (3/22/2024)    Financial Resource Strain      Within the past 12 months, have you or your family members you live with been unable to get utilities (heat, electricity) when it was really needed?: No   Food Insecurity: Low Risk  (3/22/2024)    Food Insecurity      Within the past 12 months, did you worry that your food would run out before you got money to buy  more?: No      Within the past 12 months, did the food you bought just not last and you didn t have money to get more?: No   Transportation Needs: Low Risk  (3/22/2024)    Transportation Needs      Within the past 12 months, has lack of transportation kept you from medical appointments, getting your medicines, non-medical meetings or appointments, work, or from getting things that you need?: No   Physical Activity: Unknown (3/22/2024)    Exercise Vital Sign      Days of Exercise per Week: Patient declined   Stress: Patient Declined (3/22/2024)    [employer] - Occupational Stress Questionnaire      Feeling of Stress : Patient declined   Social Connections: Unknown (3/22/2024)    Social Connection and Isolation Panel [NHANES]      Frequency of Social Gatherings with Friends and Family: Patient declined   Interpersonal Safety: Low Risk  (2/19/2024)    Interpersonal Safety      Do you feel physically and emotionally safe where you currently live?: Yes      Within the past 12 months, have you been hit, slapped, kicked or otherwise physically hurt by someone?: No      Within the past 12 months, have you been humiliated or emotionally abused in other ways by your partner or ex-partner?: No   Housing Stability: Low Risk  (3/22/2024)    Housing Stability      Do you have housing? : Yes      Are you worried about losing your housing?: No        Physical Exam:  Vital signs: There were no vitals taken for this visit.   General Appearance: No acute distress, appropriate demeanor, conversant  Eyes: moist conjunctivae; EOMI; pupils symmetric; visual acuity grossly intact; no proptosis  Head: normocephalic; overall symmetric appearance without deformity  Face: overall symmetric without deformity  Ears: Normal appearance of external ear; external meatus normal in appearance; TMs intact without perforation bilaterally;   Nose: No external deformity; septum (DNS L) ; inferior turbinates (non hypertrophic)    Oral Cavity/oropharynx: Normal appearance of mucosa; tongue midline; no mass or lesions; oropharynx without obvious mucosal abnormality  Neck: no palpable lymphadenopathy; thyroid without palpable nodules  Lungs: symmetric chest rise; no wheezing  CV: Good distal perfusion; normal hear rate  Extremities: No deformity  Neurologic Exam: Cranial nerves II-XII are grossly intact; no focal deficit    Procedure Note  Procedure performed: Rigid nasal endoscopy  Indication: To evaluate for sinonasal pathology not visualized on routine anterior rhinoscopy  Anesthesia: 4% topical lidocaine with 0.05% oxymetazoline  Description of procedure: A 30 degree, 3 mm rigid endoscope was inserted into bilateral nasal cavities and the nasal valves, nasal cavity, middle meatus, sphenoethmoid recess, and nasopharynx were thoroughly evaluated for evidence of obstruction, edema, purulence, polyps and/or mass/lesion.     Findings:    Double MT on the left, MM SER NP clear    The patient tolerated the procedure well without complication.     Yohannes Lennon MD    [facility]  Rhinology  Endoscopic Skull Base Surgery  [facility]  Department of Otolaryngology - Head & Neck Surgery          Again, thank you for allowing me to participate in the care of your patient.      Sincerely,    Yohannes Lennon MD

## 2024-09-25 ENCOUNTER — ANCILLARY PROCEDURE (OUTPATIENT)
Dept: CT IMAGING | Facility: CLINIC | Age: 29
End: 2024-09-25
Attending: STUDENT IN AN ORGANIZED HEALTH CARE EDUCATION/TRAINING PROGRAM
Payer: COMMERCIAL

## 2024-09-25 DIAGNOSIS — J34.2 DEVIATED NASAL SEPTUM: ICD-10-CM

## 2024-09-25 DIAGNOSIS — R09.82 POST-NASAL DRIP: ICD-10-CM

## 2024-09-25 PROCEDURE — 70486 CT MAXILLOFACIAL W/O DYE: CPT | Performed by: RADIOLOGY

## 2025-02-20 ENCOUNTER — PATIENT OUTREACH (OUTPATIENT)
Dept: CARE COORDINATION | Facility: CLINIC | Age: 30
End: 2025-02-20
Payer: COMMERCIAL

## 2025-03-06 ENCOUNTER — PATIENT OUTREACH (OUTPATIENT)
Dept: CARE COORDINATION | Facility: CLINIC | Age: 30
End: 2025-03-06
Payer: COMMERCIAL

## 2025-04-26 ENCOUNTER — HEALTH MAINTENANCE LETTER (OUTPATIENT)
Age: 30
End: 2025-04-26